# Patient Record
Sex: FEMALE | Race: ASIAN | Employment: OTHER | URBAN - METROPOLITAN AREA
[De-identification: names, ages, dates, MRNs, and addresses within clinical notes are randomized per-mention and may not be internally consistent; named-entity substitution may affect disease eponyms.]

---

## 2017-02-06 ENCOUNTER — OFFICE VISIT (OUTPATIENT)
Dept: FAMILY MEDICINE | Facility: CLINIC | Age: 67
End: 2017-02-06
Payer: COMMERCIAL

## 2017-02-06 ENCOUNTER — HOSPITAL ENCOUNTER (INPATIENT)
Facility: CLINIC | Age: 67
LOS: 2 days | Discharge: HOME | DRG: 193 | End: 2017-02-08
Attending: PHYSICIAN ASSISTANT | Admitting: FAMILY MEDICINE
Payer: COMMERCIAL

## 2017-02-06 ENCOUNTER — APPOINTMENT (OUTPATIENT)
Dept: GENERAL RADIOLOGY | Facility: CLINIC | Age: 67
DRG: 193 | End: 2017-02-06
Attending: EMERGENCY MEDICINE
Payer: COMMERCIAL

## 2017-02-06 ENCOUNTER — APPOINTMENT (OUTPATIENT)
Dept: CT IMAGING | Facility: CLINIC | Age: 67
DRG: 193 | End: 2017-02-06
Attending: EMERGENCY MEDICINE
Payer: COMMERCIAL

## 2017-02-06 VITALS
SYSTOLIC BLOOD PRESSURE: 122 MMHG | WEIGHT: 121.2 LBS | DIASTOLIC BLOOD PRESSURE: 66 MMHG | RESPIRATION RATE: 24 BRPM | HEART RATE: 93 BPM | BODY MASS INDEX: 24.44 KG/M2 | HEIGHT: 59 IN | TEMPERATURE: 101 F | OXYGEN SATURATION: 86 %

## 2017-02-06 DIAGNOSIS — J45.31 MILD PERSISTENT ASTHMA WITH EXACERBATION: Primary | ICD-10-CM

## 2017-02-06 DIAGNOSIS — R05.9 COUGH: ICD-10-CM

## 2017-02-06 DIAGNOSIS — R10.84 ABDOMINAL PAIN, GENERALIZED: ICD-10-CM

## 2017-02-06 DIAGNOSIS — J18.9 COMMUNITY ACQUIRED PNEUMONIA: ICD-10-CM

## 2017-02-06 DIAGNOSIS — R50.9 FEVER, UNSPECIFIED: Primary | ICD-10-CM

## 2017-02-06 DIAGNOSIS — J45.901 ASTHMA EXACERBATION: ICD-10-CM

## 2017-02-06 DIAGNOSIS — J96.01 ACUTE RESPIRATORY FAILURE WITH HYPOXIA (H): ICD-10-CM

## 2017-02-06 DIAGNOSIS — J45.901 ASTHMA WITH ACUTE EXACERBATION, UNSPECIFIED ASTHMA SEVERITY: ICD-10-CM

## 2017-02-06 LAB
ALBUMIN SERPL-MCNC: 3 G/DL (ref 3.4–5)
ALP SERPL-CCNC: 60 U/L (ref 40–150)
ALT SERPL W P-5'-P-CCNC: 35 U/L (ref 0–50)
ANION GAP SERPL CALCULATED.3IONS-SCNC: 6 MMOL/L (ref 3–14)
AST SERPL W P-5'-P-CCNC: 54 U/L (ref 0–45)
BASOPHILS # BLD AUTO: 0.1 10E9/L (ref 0–0.2)
BASOPHILS NFR BLD AUTO: 0.9 %
BILIRUB SERPL-MCNC: 0.7 MG/DL (ref 0.2–1.3)
BUN SERPL-MCNC: 14 MG/DL (ref 7–30)
CALCIUM SERPL-MCNC: 8.1 MG/DL (ref 8.5–10.1)
CHLORIDE SERPL-SCNC: 103 MMOL/L (ref 94–109)
CO2 SERPL-SCNC: 29 MMOL/L (ref 20–32)
CREAT SERPL-MCNC: 0.86 MG/DL (ref 0.52–1.04)
D DIMER PPP FEU-MCNC: 1.1 UG/ML FEU (ref 0–0.5)
DIFFERENTIAL METHOD BLD: ABNORMAL
EOSINOPHIL # BLD AUTO: 0 10E9/L (ref 0–0.7)
EOSINOPHIL NFR BLD AUTO: 0.2 %
ERYTHROCYTE [DISTWIDTH] IN BLOOD BY AUTOMATED COUNT: 12.7 % (ref 10–15)
FLUAV+FLUBV AG SPEC QL: NEGATIVE
FLUAV+FLUBV AG SPEC QL: NEGATIVE
GFR SERPL CREATININE-BSD FRML MDRD: 66 ML/MIN/1.7M2
GLUCOSE SERPL-MCNC: 106 MG/DL (ref 70–99)
HCT VFR BLD AUTO: 40.7 % (ref 35–47)
HGB BLD-MCNC: 14.2 G/DL (ref 11.7–15.7)
IMM GRANULOCYTES # BLD: 0 10E9/L (ref 0–0.4)
IMM GRANULOCYTES NFR BLD: 0.1 %
LYMPHOCYTES # BLD AUTO: 0.9 10E9/L (ref 0.8–5.3)
LYMPHOCYTES NFR BLD AUTO: 10.2 %
MCH RBC QN AUTO: 34 PG (ref 26.5–33)
MCHC RBC AUTO-ENTMCNC: 34.9 G/DL (ref 31.5–36.5)
MCV RBC AUTO: 97 FL (ref 78–100)
MONOCYTES # BLD AUTO: 1 10E9/L (ref 0–1.3)
MONOCYTES NFR BLD AUTO: 10.6 %
NEUTROPHILS # BLD AUTO: 7.1 10E9/L (ref 1.6–8.3)
NEUTROPHILS NFR BLD AUTO: 78 %
NT-PROBNP SERPL-MCNC: 651 PG/ML (ref 0–900)
PLATELET # BLD AUTO: 153 10E9/L (ref 150–450)
PLATELET # BLD EST: NORMAL 10*3/UL
POTASSIUM SERPL-SCNC: 3.8 MMOL/L (ref 3.4–5.3)
PROT SERPL-MCNC: 7.3 G/DL (ref 6.8–8.8)
RBC # BLD AUTO: 4.18 10E12/L (ref 3.8–5.2)
RBC MORPH BLD: NORMAL
SODIUM SERPL-SCNC: 138 MMOL/L (ref 133–144)
SPECIMEN SOURCE: NORMAL
WBC # BLD AUTO: 9.1 10E9/L (ref 4–11)

## 2017-02-06 PROCEDURE — 71020 XR CHEST 2 VW: CPT

## 2017-02-06 PROCEDURE — 12000007 ZZH R&B INTERMEDIATE

## 2017-02-06 PROCEDURE — 25000132 ZZH RX MED GY IP 250 OP 250 PS 637: Performed by: EMERGENCY MEDICINE

## 2017-02-06 PROCEDURE — 99223 1ST HOSP IP/OBS HIGH 75: CPT | Mod: AI | Performed by: FAMILY MEDICINE

## 2017-02-06 PROCEDURE — 87804 INFLUENZA ASSAY W/OPTIC: CPT | Performed by: NURSE PRACTITIONER

## 2017-02-06 PROCEDURE — 83880 ASSAY OF NATRIURETIC PEPTIDE: CPT | Performed by: EMERGENCY MEDICINE

## 2017-02-06 PROCEDURE — 40000275 ZZH STATISTIC RCP TIME EA 10 MIN

## 2017-02-06 PROCEDURE — 40000274 ZZH STATISTIC RCP CONSULT EA 30 MIN

## 2017-02-06 PROCEDURE — 94640 AIRWAY INHALATION TREATMENT: CPT

## 2017-02-06 PROCEDURE — 80053 COMPREHEN METABOLIC PANEL: CPT | Performed by: EMERGENCY MEDICINE

## 2017-02-06 PROCEDURE — 99215 OFFICE O/P EST HI 40 MIN: CPT | Mod: 25 | Performed by: NURSE PRACTITIONER

## 2017-02-06 PROCEDURE — 94640 AIRWAY INHALATION TREATMENT: CPT | Mod: 76 | Performed by: FAMILY MEDICINE

## 2017-02-06 PROCEDURE — 25000125 ZZHC RX 250: Performed by: EMERGENCY MEDICINE

## 2017-02-06 PROCEDURE — 25500064 ZZH RX 255 OP 636: Performed by: EMERGENCY MEDICINE

## 2017-02-06 PROCEDURE — 96365 THER/PROPH/DIAG IV INF INIT: CPT | Mod: 59

## 2017-02-06 PROCEDURE — 94640 AIRWAY INHALATION TREATMENT: CPT | Mod: 76

## 2017-02-06 PROCEDURE — 99285 EMERGENCY DEPT VISIT HI MDM: CPT | Mod: 25

## 2017-02-06 PROCEDURE — 25000128 H RX IP 250 OP 636: Performed by: EMERGENCY MEDICINE

## 2017-02-06 PROCEDURE — 85379 FIBRIN DEGRADATION QUANT: CPT | Performed by: EMERGENCY MEDICINE

## 2017-02-06 PROCEDURE — 25000125 ZZHC RX 250: Performed by: FAMILY MEDICINE

## 2017-02-06 PROCEDURE — 94640 AIRWAY INHALATION TREATMENT: CPT | Performed by: NURSE PRACTITIONER

## 2017-02-06 PROCEDURE — 71260 CT THORAX DX C+: CPT

## 2017-02-06 PROCEDURE — 85025 COMPLETE CBC W/AUTO DIFF WBC: CPT | Performed by: EMERGENCY MEDICINE

## 2017-02-06 PROCEDURE — 99285 EMERGENCY DEPT VISIT HI MDM: CPT | Performed by: EMERGENCY MEDICINE

## 2017-02-06 RX ORDER — PREDNISONE 20 MG/1
TABLET ORAL
Qty: 20 TABLET | Refills: 0 | Status: ON HOLD | OUTPATIENT
Start: 2017-02-06 | End: 2017-02-08

## 2017-02-06 RX ORDER — IPRATROPIUM BROMIDE AND ALBUTEROL SULFATE 2.5; .5 MG/3ML; MG/3ML
3 SOLUTION RESPIRATORY (INHALATION) ONCE
Status: COMPLETED | OUTPATIENT
Start: 2017-02-06 | End: 2017-02-06

## 2017-02-06 RX ORDER — PREDNISONE 20 MG/1
60 TABLET ORAL ONCE
Status: COMPLETED | OUTPATIENT
Start: 2017-02-06 | End: 2017-02-06

## 2017-02-06 RX ORDER — ALBUTEROL SULFATE 0.83 MG/ML
1 SOLUTION RESPIRATORY (INHALATION) EVERY 6 HOURS PRN
Qty: 1 BOX | Refills: 0 | Status: SHIPPED | OUTPATIENT
Start: 2017-02-06 | End: 2017-02-06

## 2017-02-06 RX ORDER — CEFTRIAXONE 2 G/1
2 INJECTION, POWDER, FOR SOLUTION INTRAMUSCULAR; INTRAVENOUS EVERY 24 HOURS
Status: DISCONTINUED | OUTPATIENT
Start: 2017-02-07 | End: 2017-02-08 | Stop reason: HOSPADM

## 2017-02-06 RX ORDER — IPRATROPIUM BROMIDE AND ALBUTEROL SULFATE 2.5; .5 MG/3ML; MG/3ML
3 SOLUTION RESPIRATORY (INHALATION) 4 TIMES DAILY
Status: DISCONTINUED | OUTPATIENT
Start: 2017-02-06 | End: 2017-02-08 | Stop reason: HOSPADM

## 2017-02-06 RX ORDER — AZITHROMYCIN 250 MG/1
250 TABLET, FILM COATED ORAL EVERY 24 HOURS
Status: DISCONTINUED | OUTPATIENT
Start: 2017-02-07 | End: 2017-02-08 | Stop reason: HOSPADM

## 2017-02-06 RX ORDER — IOPAMIDOL 755 MG/ML
70 INJECTION, SOLUTION INTRAVASCULAR ONCE
Status: COMPLETED | OUTPATIENT
Start: 2017-02-06 | End: 2017-02-06

## 2017-02-06 RX ORDER — ALBUTEROL SULFATE 0.83 MG/ML
3 SOLUTION RESPIRATORY (INHALATION)
Status: DISCONTINUED | OUTPATIENT
Start: 2017-02-06 | End: 2017-02-08 | Stop reason: HOSPADM

## 2017-02-06 RX ORDER — AZITHROMYCIN 250 MG/1
500 TABLET, FILM COATED ORAL ONCE
Status: COMPLETED | OUTPATIENT
Start: 2017-02-06 | End: 2017-02-06

## 2017-02-06 RX ORDER — AZITHROMYCIN 250 MG/1
250 TABLET, FILM COATED ORAL DAILY
Qty: 6 TABLET | Refills: 0 | Status: SHIPPED | OUTPATIENT
Start: 2017-02-06 | End: 2017-02-08

## 2017-02-06 RX ORDER — CEFTRIAXONE 2 G/1
2 INJECTION, POWDER, FOR SOLUTION INTRAMUSCULAR; INTRAVENOUS ONCE
Status: COMPLETED | OUTPATIENT
Start: 2017-02-06 | End: 2017-02-06

## 2017-02-06 RX ORDER — PREDNISONE 20 MG/1
60 TABLET ORAL DAILY
Status: DISCONTINUED | OUTPATIENT
Start: 2017-02-07 | End: 2017-02-08 | Stop reason: HOSPADM

## 2017-02-06 RX ORDER — AZITHROMYCIN 250 MG/1
250 TABLET, FILM COATED ORAL DAILY
Qty: 4 TABLET | Refills: 0 | Status: SHIPPED | OUTPATIENT
Start: 2017-02-06 | End: 2017-02-06

## 2017-02-06 RX ORDER — CODEINE PHOSPHATE AND GUAIFENESIN 10; 100 MG/5ML; MG/5ML
1 SOLUTION ORAL EVERY 4 HOURS PRN
Qty: 120 ML | Refills: 0 | Status: SHIPPED | OUTPATIENT
Start: 2017-02-06 | End: 2017-02-06 | Stop reason: SINTOL

## 2017-02-06 RX ORDER — ALBUTEROL SULFATE 0.83 MG/ML
1 SOLUTION RESPIRATORY (INHALATION) EVERY 4 HOURS PRN
Qty: 1 BOX | Refills: 1 | Status: SHIPPED | OUTPATIENT
Start: 2017-02-06 | End: 2017-02-15

## 2017-02-06 RX ADMIN — SODIUM CHLORIDE 500 ML: 9 INJECTION, SOLUTION INTRAVENOUS at 14:56

## 2017-02-06 RX ADMIN — SODIUM CHLORIDE 90 ML: 9 INJECTION, SOLUTION INTRAVENOUS at 14:59

## 2017-02-06 RX ADMIN — IPRATROPIUM BROMIDE AND ALBUTEROL SULFATE 3 ML: .5; 3 SOLUTION RESPIRATORY (INHALATION) at 21:12

## 2017-02-06 RX ADMIN — IPRATROPIUM BROMIDE AND ALBUTEROL SULFATE 3 ML: .5; 3 SOLUTION RESPIRATORY (INHALATION) at 13:27

## 2017-02-06 RX ADMIN — PREDNISONE 60 MG: 20 TABLET ORAL at 13:25

## 2017-02-06 RX ADMIN — CEFTRIAXONE 2 G: 2 INJECTION, POWDER, FOR SOLUTION INTRAMUSCULAR; INTRAVENOUS at 14:55

## 2017-02-06 RX ADMIN — IOPAMIDOL 70 ML: 755 INJECTION, SOLUTION INTRAVENOUS at 15:00

## 2017-02-06 RX ADMIN — IPRATROPIUM BROMIDE AND ALBUTEROL SULFATE 3 ML: .5; 3 SOLUTION RESPIRATORY (INHALATION) at 14:57

## 2017-02-06 RX ADMIN — AZITHROMYCIN 500 MG: 250 TABLET, FILM COATED ORAL at 13:26

## 2017-02-06 ASSESSMENT — ENCOUNTER SYMPTOMS: ABDOMINAL PAIN: 1

## 2017-02-06 ASSESSMENT — ACTIVITIES OF DAILY LIVING (ADL)
AMBULATION: 0-->INDEPENDENT
FALL_HISTORY_WITHIN_LAST_SIX_MONTHS: NO
RETIRED_EATING: 0-->INDEPENDENT
BATHING: 0-->INDEPENDENT
DRESS: 0-->INDEPENDENT
TOILETING: 0-->INDEPENDENT
RETIRED_COMMUNICATION: 0-->UNDERSTANDS/COMMUNICATES WITHOUT DIFFICULTY
TRANSFERRING: 0-->INDEPENDENT
CHANGE_IN_FUNCTIONAL_STATUS_SINCE_ONSET_OF_CURRENT_ILLNESS/INJURY: NO
SWALLOWING: 0-->SWALLOWS FOODS/LIQUIDS WITHOUT DIFFICULTY
COGNITION: 0 - NO COGNITION ISSUES REPORTED

## 2017-02-06 NOTE — ED AVS SNAPSHOT
[facility] Emergency Department    [address] [facility] [address]    Phone:  [phone]    Fax:  [phone]                                       Karen Guevara   MRN: [id number]    Department:  [facility] Emergency Department   Date of Visit:  2/6/2017           Patient Information     Date Of Birth          1950        Your diagnoses for this visit were:     Asthma exacerbation     Community acquired pneumonia     Asthma with acute exacerbation, unspecified asthma severity        You were seen by Fatou Rodas PA-C and Geronimo Jackson MD.      Follow-up Information     Schedule an appointment as soon as possible for a visit with Lavern Jefferson DO.    Specialty:  Internal Medicine    Why:  For re-evaluation if symptoms not resolving over the next 24-48 hours    Contact information:    [address] [address]  [phone]          Follow up with [facility] Emergency Department.    Specialty:  EMERGENCY MEDICINE    Why:  If symptoms worsen    Contact information:    [address] [place] [address] [phone]    Additional information:    The medical center is located at   [address] [place] [address] (between [facility] and   [place] [address] in [place], [place] miles north   of [place]).        Discharge Instructions         Pneumonia (Adult)  Pneumonia is an infection deep within the lungs. It is in the small air sacs (alveoli). Pneumonia may be caused by a virus or bacteria. Pneumonia caused by bacteria is usually treated with an antibiotic. Severe cases may need to be treated in the hospital. Milder cases can be treated at home. Symptoms usually start to get better during the first 2 days of treatment.    Home care  Follow these guidelines when caring for yourself at home:    Rest at home for the first 2 to 3 days, or until you feel stronger. Don t let yourself get overly tired when you go back to your activities.    Stay away  from cigarette smoke - yours or other people s.    You may use acetaminophen or ibuprofen to control fever or pain, unless another medicine was prescribed. If you have chronic liver or kidney disease, talk with your health care provider before using these medicines. Also talk with your provider if you ve had a stomach ulcer or GI bleeding. Don t give aspirin to anyone younger than 18 years of age who is ill with a fever. It may cause severe liver damage.    Your appetite may be poor, so a light diet is fine.    Drink 6 to 8 glasses of fluids every day to make sure you are getting enough fluids. Beverages can include water, sport drinks, sodas without caffeine, juices, tea, or soup. Fluids will help loosen secretions in the lung. This will make it easier for you to cough up the phlegm (sputum). If you also have heart or kidney disease, check with your health care provider before you drink extra fluids.    Take antibiotic medicine prescribed until it is all gone, even if you are feeling better after a few days.  Follow-up care  Follow up with your health care provider in the next 2 to 3 days, or as advised. This is to be sure the medicine is helping you get better.  If you are 65 or older, you should get a pneumococcal vaccine and a yearly flu (influenza) shot. You should also get these vaccines if you have chronic lung disease like asthma, emphysema, or COPD. Ask your provider about this.  When to seek medical advice  Call your health care provider right away if any of these occur:    You don t get better within the first 48 hours of treatment    Shortness of breath gets worse    Rapid breathing (more than 25 breaths per minute)    Coughing up blood    Chest pain gets worse with breathing    Fever of 102 F (38 C) or higher that doesn t get better with fever medicine    Weakness, dizziness, or fainting that gets worse    Thirst or dry mouth that gets worse    Sinus pain, headache, or a stiff neck    Chest pain not  caused by coughing    5059-8679 [employer]. [address] [place] [address], [place] [address]. All rights reserved. This information is not intended as a substitute for professional medical care. Always follow your healthcare professional's instructions.          Discharge References/Attachments     PNEUMONIA, WHAT IS (ENGLISH)    PNEUMONIA, DISCHARGE INSTRUCTIONS FOR (ENGLISH)    ASTHMA, ACUTE (ADULT) (ENGLISH)    ASTHMA, DISCHARGE INSTRUCTIONS FOR (ENGLISH)    ASTHMA, UNDERSTANDING (ENGLISH)      24 Hour Appointment Hotline       To make an appointment at any [facility], call [phone] ([phone]). If you don't have a family doctor or clinic, we will help you find one. [place] clinics are conveniently located to serve the needs of you and your family.             Review of your medicines      START taking        Dose / Directions Last dose taken    amoxicillin-clavulanate 875-125 MG per tablet   Commonly known as:  AUGMENTIN   Dose:  1 tablet   Quantity:  20 tablet        Take 1 tablet by mouth 2 times daily for 10 days   Refills:  0        azithromycin 250 MG tablet   Commonly known as:  ZITHROMAX   Dose:  250 mg   Quantity:  6 tablet        Take 1 tablet (250 mg) by mouth daily for 6 days One tablet daily for 6 days beginning on Tuesday 2/7/17 (1st dose given in the ER).   Refills:  0          CONTINUE these medicines which may have CHANGED, or have new prescriptions. If we are uncertain of the size of tablets/capsules you have at home, strength may be listed as something that might have changed.        Dose / Directions Last dose taken    * albuterol 108 (90 BASE) MCG/ACT Inhaler   Commonly known as:  VENTOLIN HFA   Dose:  2 puff   What changed:  Another medication with the same name was changed. Make sure you understand how and when to take each.   Quantity:  1 Inhaler        Inhale 2 puffs into the lungs every 4 hours as needed for shortness of breath / dyspnea or wheezing   Refills:   11        * albuterol (2.5 MG/3ML) 0.083% neb solution   Dose:  1 vial   What changed:  when to take this   Quantity:  1 Box        Take 1 vial (2.5 mg) by nebulization every 4 hours as needed for shortness of breath / dyspnea or wheezing   Refills:  1        * Notice:  This list has 2 medication(s) that are the same as other medications prescribed for you. Read the directions carefully, and ask your doctor or other care provider to review them with you.      Our records show that you are taking the medicines listed below. If these are incorrect, please call your family doctor or clinic.        Dose / Directions Last dose taken    cholecalciferol 1000 UNIT tablet   Commonly known as:  vitamin D        1 TABLET DAILY   Refills:  0        CHOLEST OFF PO        take one tablet by mouth when eating a meal that is fatty   Refills:  0        coenzyme Q-10 capsule   Dose:  1 capsule        Take 1 capsule by mouth daily as needed   Refills:  0        DAILY HERBS IMMUNE DEFENSE PO   Dose:  2 capsule        Take 2 capsules by mouth every evening Immune System Defense with IP-6.  Supports healthy cell development   Refills:  0        fexofenadine 180 MG tablet   Commonly known as:  ALLEGRA   Dose:  180 mg   Quantity:  30 tablet        Take 1 tablet (180 mg) by mouth daily   Refills:  1        fluticasone 50 MCG/ACT spray   Commonly known as:  FLONASE   Dose:  1-2 spray   Quantity:  1 Bottle        Spray 1-2 sprays into both nostrils daily   Refills:  11        OIL OF OREGANO PO        Refills:  0        predniSONE 20 MG tablet   Commonly known as:  DELTASONE   Quantity:  20 tablet        Take 3 tabs (60 mg) by mouth daily x 3 days, 2 tabs (40 mg) daily x 3 days, 1 tab (20 mg) daily x 3 days, then 1/2 tab (10 mg) x 3 days.   Refills:  0        VITAMIN A PO   Dose:  1 capsule        Take 1 capsule by mouth daily   Refills:  0        vitamin B complex with vitamin C Tabs tablet   Dose:  1 tablet        Take 1 tablet by mouth  daily   Refills:  0        Zinc 25 MG Tabs   Dose:  0.5 tablet        Take 0.5 tablets by mouth daily Taking 1/2 tablet daily.   Refills:  0                Prescriptions were sent or printed at these locations (3 Prescriptions)                   [facility] [place], [place] - [address] [facility]   [address] [facility] [address]    Telephone:  [phone]   Fax:  [phone]   Hours:                  E-Prescribed (3 of 3)         albuterol (2.5 MG/3ML) 0.083% neb solution               amoxicillin-clavulanate (AUGMENTIN) 875-125 MG per tablet               azithromycin (ZITHROMAX) 250 MG tablet                Procedures and tests performed during your visit     CBC with platelets, differential    Chest CT - IV contrast only - PE protocol    Comprehensive metabolic panel    D dimer quantitative    NT pro BNP    XR Chest 2 Views      Orders Needing Specimen Collection     None      Pending Results     No orders found from 2/5/2017 to 2/7/2017.            Pending Culture Results     No orders found from 2/5/2017 to 2/7/2017.       Test Results from your hospital stay           2/6/2017  3:05 PM - Interface, iyzico Results      Component Results     Component Value Ref Range & Units Status    WBC 9.1 4.0 - 11.0 10e9/L Final    RBC Count 4.18 3.8 - 5.2 10e12/L Final    Hemoglobin 14.2 11.7 - 15.7 g/dL Final    Hematocrit 40.7 35.0 - 47.0 % Final    MCV 97 78 - 100 fl Final    MCH 34.0 (H) 26.5 - 33.0 pg Final    MCHC 34.9 31.5 - 36.5 g/dL Final    RDW 12.7 10.0 - 15.0 % Final    Platelet Count 153 150 - 450 10e9/L Final    Diff Method Automated Method  Final    % Neutrophils 78.0 % Final    % Lymphocytes 10.2 % Final    % Monocytes 10.6 % Final    % Eosinophils 0.2 % Final    % Basophils 0.9 % Final    % Immature Granulocytes 0.1 % Final    Absolute Neutrophil 7.1 1.6 - 8.3 10e9/L Final    Absolute Lymphocytes 0.9 0.8 - 5.3 10e9/L Final    Absolute Monocytes 1.0 0.0 - 1.3 10e9/L Final    Absolute  Eosinophils 0.0 0.0 - 0.7 10e9/L Final    Absolute Basophils 0.1 0.0 - 0.2 10e9/L Final    Abs Immature Granulocytes 0.0 0 - 0.4 10e9/L Final    RBC Morphology Normal  Final    Platelet Estimate Normal  Final         2/6/2017  2:09 PM - Interface, Flexilab Results      Component Results     Component Value Ref Range & Units Status    Sodium 138 133 - 144 mmol/L Final    Potassium 3.8 3.4 - 5.3 mmol/L Final    Chloride 103 94 - 109 mmol/L Final    Carbon Dioxide 29 20 - 32 mmol/L Final    Anion Gap 6 3 - 14 mmol/L Final    Glucose 106 (H) 70 - 99 mg/dL Final    Urea Nitrogen 14 7 - 30 mg/dL Final    Creatinine 0.86 0.52 - 1.04 mg/dL Final    GFR Estimate 66 >60 mL/min/1.7m2 Final    Non  GFR Calc    GFR Estimate If Black 80 >60 mL/min/1.7m2 Final    African American GFR Calc    Calcium 8.1 (L) 8.5 - 10.1 mg/dL Final    Bilirubin Total 0.7 0.2 - 1.3 mg/dL Final    Albumin 3.0 (L) 3.4 - 5.0 g/dL Final    Protein Total 7.3 6.8 - 8.8 g/dL Final    Alkaline Phosphatase 60 40 - 150 U/L Final    ALT 35 0 - 50 U/L Final    AST 54 (H) 0 - 45 U/L Final         2/6/2017  2:09 PM - Interface, Flexilab Results      Component Results     Component Value Ref Range & Units Status    N-Terminal Pro BNP Inpatient 651 0 - 900 pg/mL Final    Reference range shown and results flagged as abnormal are suggested inpatient   cut points for confirming diagnosis if CHF in an acute setting. Establishing   a   baseline value for each individual patient is useful for follow-up. An   inpatient or emergency department NT-proPBNP <300 pg/mL effectively rules out   acute CHF, with 99% negative predictive value.  The outpatient non-acute reference range for ruling out CHF is:   0-125 pg/mL (age 18 to less than 75)   0-450 pg/mL (age 75 yrs and older)           2/6/2017  2:07 PM - Interface, Radiant Ib      Narrative     XR CHEST 2 VW 2/6/2017 2:04 PM    HISTORY: Short of breath, fever.    COMPARISON: 12/17/2008    FINDINGS: Bilateral  basilar airspace opacity. No pleural effusion or  pneumothorax. Normal heart size.        Impression     IMPRESSION: Basilar pneumonia.    MARIAH BROOKS MD         2/6/2017  2:01 PM - Interface, Flexilab Results      Component Results     Component Value Ref Range & Units Status    D Dimer 1.1 (H) 0.0 - 0.50 ug/ml FEU Final    This D-dimer assay is intended for use in conjuntion with a clinical pretest   probability assessment model to exclude pulmonary embolism (PE) and as an aid   in the diagnosis of deep venous thrombosis (DVT) in outpatients suspected of   PE   or DVT. The cut-off value is 0.5 g/mL FEU.           2/6/2017  3:52 PM - Interface, Radiant Ib      Narrative     CT CHEST PULMONARY EMBOLISM W CONTRAST 2/6/2017 3:10 PM    HISTORY: Short of breath. Elevated d-dimer.    CONTRAST:  70mL Isovue-370.    TECHNIQUE: CT of the chest is performed with IV contrast per pulmonary  embolus protocol.    This study is particularly tailored to assess the pulmonary arteries  and their branch vessels.  Other assessed structures include the  lungs, mediastinum, pleura, and chest wall.    Radiation dose for this scan is reduced using automated exposure  control, adjustment of the mA and/or kV according to patient size, or  iterative reconstruction technique.    COMPARISON: None.    FINDINGS: Assessment of the right lung shows mixed interstitial and  nodular infiltrates, especially of the right lower lobe where more  confluent abnormality is present. Mild traction bronchiectasis in the  right middle lobe and right lower lobe are suggested. Assessment the  left lung shows interstitial and nodular infiltrates, mainly in the  left lower lobe. More confluent opacity is seen posteromedially in the  left lower lobe. Mild traction bronchiectasis is present in the  lingula and left lower lobe. Bilateral hilar and subcarinal adenopathy  are present. A dominant right hilar lymph node on image #52 measures  1.2 cm. A subcarinal lymph  "node on image #57 measures 1.1 cm. A left  hilar lymph node on image #60 measures 1 cm. The thoracic aorta is  normal in caliber with no dissection. There is a 3.5 cm cyst or nodule  in the right lobe of the thyroid gland. The upper visualized portions  of the abdomen show 0.8 cm right lobe liver lesion on image #114. A  cyst is favored.        Impression     IMPRESSION:  1.  Bilateral lung parenchymal abnormalities are present, especially  in the lower lung zones. Inflammatory/infectious etiologies are  favored.  2. Mild mediastinal and hilar adenopathy are present. They are  nonspecific. They could be reactive, given the lung findings.  3. No CT evidence of pulmonary embolus.    DAVID GONGORA MD                Thank you for choosing [facility]       Thank you for choosing [facility] for your care. Our goal is always to provide you with excellent care. Hearing back from our patients is one way we can continue to improve our services. Please take a few minutes to complete the written survey that you may receive in the mail after you visit with us. Thank you!        [employer] Information     [employer] lets you send messages to your doctor, view your test results, renew your prescriptions, schedule appointments and more. To sign up, go to [email or web address] . Click on \"Log in\" on the left side of the screen, which will take you to the Welcome page. Then click on \"Sign up Now\" on the right side of the page.     You will be asked to enter the access code listed below, as well as some personal information. Please follow the directions to create your username and password.     Your access code is: [email or web address]  Expires: 2017 12:06 PM     Your access code will  in 90 days. If you need help or a new code, please call your [facility] clinic or [phone].        Care EveryWhere ID     This is your Care EveryWhere ID. This could be used by other organizations to access your [facility] medical records  [id number]      "   After Visit Summary       This is your record. Keep this with you and show to your community pharmacist(s) and doctor(s) at your next visit.

## 2017-02-06 NOTE — DISCHARGE INSTRUCTIONS
Pneumonia (Adult)  Pneumonia is an infection deep within the lungs. It is in the small air sacs (alveoli). Pneumonia may be caused by a virus or bacteria. Pneumonia caused by bacteria is usually treated with an antibiotic. Severe cases may need to be treated in the hospital. Milder cases can be treated at home. Symptoms usually start to get better during the first 2 days of treatment.    Home care  Follow these guidelines when caring for yourself at home:    Rest at home for the first 2 to 3 days, or until you feel stronger. Don t let yourself get overly tired when you go back to your activities.    Stay away from cigarette smoke - yours or other people s.    You may use acetaminophen or ibuprofen to control fever or pain, unless another medicine was prescribed. If you have chronic liver or kidney disease, talk with your health care provider before using these medicines. Also talk with your provider if you ve had a stomach ulcer or GI bleeding. Don t give aspirin to anyone younger than 18 years of age who is ill with a fever. It may cause severe liver damage.    Your appetite may be poor, so a light diet is fine.    Drink 6 to 8 glasses of fluids every day to make sure you are getting enough fluids. Beverages can include water, sport drinks, sodas without caffeine, juices, tea, or soup. Fluids will help loosen secretions in the lung. This will make it easier for you to cough up the phlegm (sputum). If you also have heart or kidney disease, check with your health care provider before you drink extra fluids.    Take antibiotic medicine prescribed until it is all gone, even if you are feeling better after a few days.  Follow-up care  Follow up with your health care provider in the next 2 to 3 days, or as advised. This is to be sure the medicine is helping you get better.  If you are 65 or older, you should get a pneumococcal vaccine and a yearly flu (influenza) shot. You should also get these vaccines if you have  chronic lung disease like asthma, emphysema, or COPD. Ask your provider about this.  When to seek medical advice  Call your health care provider right away if any of these occur:    You don t get better within the first 48 hours of treatment    Shortness of breath gets worse    Rapid breathing (more than 25 breaths per minute)    Coughing up blood    Chest pain gets worse with breathing    Fever of 102 F (38 C) or higher that doesn t get better with fever medicine    Weakness, dizziness, or fainting that gets worse    Thirst or dry mouth that gets worse    Sinus pain, headache, or a stiff neck    Chest pain not caused by coughing    [id number] [employer]. [address] [place] [id number]. All rights reserved. This information is not intended as a substitute for professional medical care. Always follow your healthcare professional's instructions.

## 2017-02-06 NOTE — ED PROVIDER NOTES
History     Chief Complaint   Patient presents with     Shortness of Breath     HPI  Karen Guevara is a 66 year old female with a history of asthma and hyperlipidemia who was sent to the ED from clinic with after presentation for fever, asthma exacerbation, and was found to have hypoxia with oxygen sats at 86% on room air after a neb treatment. Rapid influenza test was negative. Dr. Jefferson also prescribed patient Prednisone taper: 60 mg by mouth daily x 3 days, 40 mg daily x 3 days, 20 mg daily x 3 days, then 10 mg x 3 days. Current sats in the ED are 93% on room air. The patient has asthma and reports a chronic cough that has recently become more frequent with clear sputum.  She has nasal congestion and drainage.  Four days ago she developed subjective fevers, chills, body aches, and runny nose. Her chest also feels more tight and she reports wheezing.  She is using albuterol and nebs at home but infrequently using it because nebs made her feel jittery and cranky and interrupted her sleep. The patient also mentions over the last 3 months she has noticed an intermittent cramping pain in her right upper abdomen, just below the rib cage. She last felt this pain this morning.  Pain is exacerbated by coughing.  Has not previously had this evaluated.  She does not smoke. No hemoptysis or leg pain or swelling. No other acute complaints or concerns.    I have reviewed the Medications, Allergies, Past Medical and Surgical History, and Social History in the Epic system.    Patient Active Problem List   Diagnosis     HYPOPIGMENTATION     Mixed hyperlipidemia     Hypothyroidism     Plantar fascial fibromatosis     Mild persistent asthma with exacerbation     Left sided sciatica     HYPERLIPIDEMIA LDL GOAL <160     Community acquired pneumonia     Acute respiratory failure with hypoxia (H)       Current Outpatient Prescriptions   Medication Sig Dispense Refill     VITAMIN A PO Take 1 capsule by mouth daily        Misc Natural  Products (DAILY HERBS IMMUNE DEFENSE PO) Take 2 capsules by mouth every evening Immune System Defense with IP-6.  Supports healthy cell development       predniSONE (DELTASONE) 20 MG tablet Take 3 tabs (60 mg) by mouth daily x 3 days, 2 tabs (40 mg) daily x 3 days, 1 tab (20 mg) daily x 3 days, then 1/2 tab (10 mg) x 3 days. 20 tablet 0     albuterol (2.5 MG/3ML) 0.083% neb solution Take 1 vial (2.5 mg) by nebulization every 4 hours as needed for shortness of breath / dyspnea or wheezing 1 Box 1     vitamin B complex with vitamin C (VITAMIN  B COMPLEX) TABS tablet Take 1 tablet by mouth daily       amoxicillin-clavulanate (AUGMENTIN) 875-125 MG per tablet Take 1 tablet by mouth 2 times daily for 10 days 20 tablet 0     azithromycin (ZITHROMAX) 250 MG tablet Take 1 tablet (250 mg) by mouth daily for 6 days One tablet daily for 6 days beginning on Tuesday 2/7/17 (1st dose given in the ER). 6 tablet 0     fexofenadine (ALLEGRA) 180 MG tablet Take 1 tablet (180 mg) by mouth daily 30 tablet 1     fluticasone (FLONASE) 50 MCG/ACT nasal spray Spray 1 spray into both nostrils 2 times daily  1 Bottle 11     albuterol (VENTOLIN HFA) 108 (90 BASE) MCG/ACT inhaler Inhale 2 puffs into the lungs every 4 hours as needed for shortness of breath / dyspnea or wheezing 1 Inhaler 11     OIL OF OREGANO PO        Zinc 25 MG TABS Take 0.5 tablets by mouth daily Taking 1/2 tablet daily.       Coenzyme Q-10 capsule Take 1 capsule by mouth daily as needed        VITAMIN D 1000 UNIT OR TABS 1 TABLET DAILY       [DISCONTINUED] albuterol (2.5 MG/3ML) 0.083% neb solution Take 1 vial (2.5 mg) by nebulization every 6 hours as needed for shortness of breath / dyspnea or wheezing 1 Box 0     CHOLEST OFF OR take one tablet by mouth when eating a meal that is fatty         Allergies   Allergen Reactions     Amoxicillin Nausea and Vomiting     Claritin [Loratadine] Hives       Social History   Substance Use Topics     Smoking status: Never Smoker       "Smokeless tobacco: Never Used     Alcohol Use: No       Review of Systems   Gastrointestinal: Positive for abdominal pain (RUQ).   As mentioned above in the history present illness. All other systems were reviewed and are negative.    Physical Exam   BP: 105/68 mmHg  Pulse: 90  Heart Rate: 90  Temp: 99.6  F (37.6  C)  Resp: 20  Height: 149.9 cm (4' 11\")  Weight: 54.885 kg (121 lb)  SpO2: 95 %    Physical Exam   Constitutional: She is oriented to person, place, and time. She appears well-developed and well-nourished. No distress.   HENT:   Head: Normocephalic and atraumatic.   Mouth/Throat: Oropharynx is clear and moist. No oropharyngeal exudate.   Eyes: Conjunctivae and EOM are normal. No scleral icterus.   Neck: Normal range of motion. Neck supple. No JVD present. No tracheal deviation present. No thyromegaly present.   Cardiovascular: Normal rate, regular rhythm and normal heart sounds.  Exam reveals no gallop and no friction rub.    No murmur heard.  Pulmonary/Chest: Effort normal. No respiratory distress. She has decreased breath sounds (mildly decreased breath sounds, symmetrically). She has wheezes (scattered and expiratory wheezes and rhonchi, mildly prolonged exp phase and decreased air movement, speaks in full sentences normally). She has rhonchi (basilar). She has no rales.   Musculoskeletal: Normal range of motion. She exhibits no edema or tenderness.   Neurological: She is alert and oriented to person, place, and time.   Skin: Skin is warm and dry. No rash noted. She is not diaphoretic. No erythema. No pallor.   Psychiatric: She has a normal mood and affect. Her behavior is normal.   Nursing note and vitals reviewed.      ED Course   Procedures             Results for orders placed or performed during the hospital encounter of 02/06/17 (from the past 24 hour(s))   CBC with platelets, differential   Result Value Ref Range    WBC 9.1 4.0 - 11.0 10e9/L    RBC Count 4.18 3.8 - 5.2 10e12/L    Hemoglobin 14.2 " 11.7 - 15.7 g/dL    Hematocrit 40.7 35.0 - 47.0 %    MCV 97 78 - 100 fl    MCH 34.0 (H) 26.5 - 33.0 pg    MCHC 34.9 31.5 - 36.5 g/dL    RDW 12.7 10.0 - 15.0 %    Platelet Count 153 150 - 450 10e9/L    Diff Method Automated Method     % Neutrophils 78.0 %    % Lymphocytes 10.2 %    % Monocytes 10.6 %    % Eosinophils 0.2 %    % Basophils 0.9 %    % Immature Granulocytes 0.1 %    Absolute Neutrophil 7.1 1.6 - 8.3 10e9/L    Absolute Lymphocytes 0.9 0.8 - 5.3 10e9/L    Absolute Monocytes 1.0 0.0 - 1.3 10e9/L    Absolute Eosinophils 0.0 0.0 - 0.7 10e9/L    Absolute Basophils 0.1 0.0 - 0.2 10e9/L    Abs Immature Granulocytes 0.0 0 - 0.4 10e9/L    RBC Morphology Normal     Platelet Estimate Normal    Comprehensive metabolic panel   Result Value Ref Range    Sodium 138 133 - 144 mmol/L    Potassium 3.8 3.4 - 5.3 mmol/L    Chloride 103 94 - 109 mmol/L    Carbon Dioxide 29 20 - 32 mmol/L    Anion Gap 6 3 - 14 mmol/L    Glucose 106 (H) 70 - 99 mg/dL    Urea Nitrogen 14 7 - 30 mg/dL    Creatinine 0.86 0.52 - 1.04 mg/dL    GFR Estimate 66 >60 mL/min/1.7m2    GFR Estimate If Black 80 >60 mL/min/1.7m2    Calcium 8.1 (L) 8.5 - 10.1 mg/dL    Bilirubin Total 0.7 0.2 - 1.3 mg/dL    Albumin 3.0 (L) 3.4 - 5.0 g/dL    Protein Total 7.3 6.8 - 8.8 g/dL    Alkaline Phosphatase 60 40 - 150 U/L    ALT 35 0 - 50 U/L    AST 54 (H) 0 - 45 U/L   NT pro BNP   Result Value Ref Range    N-Terminal Pro BNP Inpatient 651 0 - 900 pg/mL   D dimer quantitative   Result Value Ref Range    D Dimer 1.1 (H) 0.0 - 0.50 ug/ml FEU   XR Chest 2 Views    Narrative    XR CHEST 2 VW 2/6/2017 2:04 PM    HISTORY: Short of breath, fever.    COMPARISON: 12/17/2008    FINDINGS: Bilateral basilar airspace opacity. No pleural effusion or  pneumothorax. Normal heart size.      Impression    IMPRESSION: Basilar pneumonia.    MARIAH BROOKS MD   Chest CT - IV contrast only - PE protocol    Narrative    CT CHEST PULMONARY EMBOLISM W CONTRAST 2/6/2017 3:10 PM    HISTORY: Short  of breath. Elevated d-dimer.    CONTRAST:  70mL Isovue-370.    TECHNIQUE: CT of the chest is performed with IV contrast per pulmonary  embolus protocol.    This study is particularly tailored to assess the pulmonary arteries  and their branch vessels.  Other assessed structures include the  lungs, mediastinum, pleura, and chest wall.    Radiation dose for this scan is reduced using automated exposure  control, adjustment of the mA and/or kV according to patient size, or  iterative reconstruction technique.    COMPARISON: None.    FINDINGS: Assessment of the right lung shows mixed interstitial and  nodular infiltrates, especially of the right lower lobe where more  confluent abnormality is present. Mild traction bronchiectasis in the  right middle lobe and right lower lobe are suggested. Assessment the  left lung shows interstitial and nodular infiltrates, mainly in the  left lower lobe. More confluent opacity is seen posteromedially in the  left lower lobe. Mild traction bronchiectasis is present in the  lingula and left lower lobe. Bilateral hilar and subcarinal adenopathy  are present. A dominant right hilar lymph node on image #52 measures  1.2 cm. A subcarinal lymph node on image #57 measures 1.1 cm. A left  hilar lymph node on image #60 measures 1 cm. The thoracic aorta is  normal in caliber with no dissection. There is a 3.5 cm cyst or nodule  in the right lobe of the thyroid gland. The upper visualized portions  of the abdomen show 0.8 cm right lobe liver lesion on image #114. A  cyst is favored.      Impression    IMPRESSION:  1.  Bilateral lung parenchymal abnormalities are present, especially  in the lower lung zones. Inflammatory/infectious etiologies are  favored.  2. Mild mediastinal and hilar adenopathy are present. They are  nonspecific. They could be reactive, given the lung findings.  3. No CT evidence of pulmonary embolus.    DAVID GONGORA MD       Medications   predniSONE (DELTASONE) tablet 60 mg  (60 mg Oral Given 2/6/17 1325)   azithromycin (ZITHROMAX) tablet 500 mg (500 mg Oral Given 2/6/17 1326)   ipratropium - albuterol 0.5 mg/2.5 mg/3 mL (DUONEB) neb solution 3 mL (3 mLs Nebulization Given 2/6/17 1327)   cefTRIAXone (ROCEPHIN) 2 g vial to attach to  ml bag for ADULTS or NS 50 ml bag for PEDS (0 g Intravenous Stopped 2/6/17 1605)   ipratropium - albuterol 0.5 mg/2.5 mg/3 mL (DUONEB) neb solution 3 mL (3 mLs Nebulization Given 2/6/17 1457)   0.9% sodium chloride BOLUS (0 mLs Intravenous Stopped 2/6/17 1605)   iopamidol (ISOVUE-370) solution 70 mL (70 mLs Intravenous Given 2/6/17 1500)   sodium chloride 0.9 % for CT scan flush dose 90 mL (90 mLs As instructed Given 2/6/17 1459)     1:18 PM Sats as high as 96-98% on room air.  Baseline sat appears to be closer to 92% on RA.    5:30 PM RN reports that patient desaturated to the upper 80s at rest and to 70's with exertion (up to rest room). Will admit.    Discussed case with Dr. Grecia Cooper, hospitalist on call.    Assessments & Plan (with Medical Decision Making)   Patient with history of asthma with recent URI symptoms and asthma exacerbation refractory to multiple nebs in the ED and in primary care clinic prior to transfer to the ED today who has chest x-ray evidence of bilateral patchy infiltrates/pneumonia and hypoxia in the ED.  She'll be admitted for continued IV antibiotic therapy for community acquired pneumonia,  frequent neb therapy and continuation of steroid burst.    I have reviewed the nursing notes.    I have reviewed the findings, diagnosis, plan and need for follow up with the patient.    New Prescriptions    AMOXICILLIN-CLAVULANATE (AUGMENTIN) 875-125 MG PER TABLET    Take 1 tablet by mouth 2 times daily for 10 days    AZITHROMYCIN (ZITHROMAX) 250 MG TABLET    Take 1 tablet (250 mg) by mouth daily for 6 days One tablet daily for 6 days beginning on Tuesday 2/7/17 (1st dose given in the ER).       Final diagnoses:   Community acquired  pneumonia   Asthma with acute exacerbation, unspecified asthma severity   Acute respiratory failure with hypoxia (H)     This document serves as a record of the services and decisions personally performed and made by Geronimo Jackson MD. It was created on HIS/HER behalf by Agata Leos, a trained medical scribe. The creation of this document is based the provider's statements to the medical scribe.  Agata Leos 12:57 PM 2/6/2017    Provider:   The information in this document, created by the medical scribe for me, accurately reflects the services I personally performed and the decisions made by me. I have reviewed and approved this document for accuracy prior to leaving the patient care area.  Geronimo Jackson MD 12:57 PM 2/6/2017 2/6/2017   [facility] EMERGENCY DEPARTMENT      Geronimo Jackson MD  02/07/17 0729

## 2017-02-06 NOTE — IP AVS SNAPSHOT
[facility]    [address] [address]    Phone:  [phone]    Fax:  [phone]                                       After Visit Summary   2/6/2017    Karen Guevara    MRN: [id number]           After Visit Summary Signature Page     I have received my discharge instructions, and my questions have been answered. I have discussed any challenges I see with this plan with the nurse or doctor.    ..........................................................................................................................................  Patient/Patient Representative Signature      ..........................................................................................................................................  Patient Representative Print Name and Relationship to Patient    ..................................................               ................................................  Date                                            Time    ..........................................................................................................................................  Reviewed by Signature/Title    ...................................................              ..............................................  Date                                                            Time

## 2017-02-06 NOTE — NURSING NOTE
"Chief Complaint   Patient presents with     Asthma       Initial /66 mmHg  Pulse 93  Temp(Src) 101  F (38.3  C) (Tympanic)  Resp 40  Ht 4' 11\" (1.499 m)  Wt 121 lb 3.2 oz (54.976 kg)  BMI 24.47 kg/m2  SpO2 90% Estimated body mass index is 24.47 kg/(m^2) as calculated from the following:    Height as of this encounter: 4' 11\" (1.499 m).    Weight as of this encounter: 121 lb 3.2 oz (54.976 kg).  Medication Reconciliation: complete    "

## 2017-02-06 NOTE — MR AVS SNAPSHOT
After Visit Summary   2/6/2017    Karen Guevara    MRN: [id number]           Patient Information     Date Of Birth          1950        Visit Information        Provider Department      2/6/2017 11:20 AM Lavern Galvan APRN [facility]        Today's Diagnoses     Fever, unspecified    -  1     Asthma exacerbation         Abdominal pain, generalized         Cough           Care Instructions    1. Xray today of chest and abdomen today  2. Take Prednisone as directed  3. Use cough syrup as needed for cough- this can make you sleepy so do not drive when driving  4. Schedule an appointment with Asthma / allergy clinic            Thank you for choosing [facility].  You may be receiving a survey in the mail from [employer] regarding your visit today.  Please take a few minutes to complete and return the survey to let us know how we are doing.      If you have questions or concerns, please contact us via [employer] or you can contact your care team at [phone].    Our Clinic hours are:  Monday 6:40 am  to 7:00 pm  Tuesday -Friday 6:40 am to 5:00 pm    The [place] outpatient lab hours are:  Monday - Friday 6:10 am to 4:45 pm  Saturdays 7:00 am to 11:00 am  Appointments are required, call [phone]    If you have clinical questions after hours or would like to schedule an appointment,  call the clinic at [phone].        Follow-ups after your visit        Additional Services     ALLERGY/ASTHMA ADULT REFERRAL       Your provider has referred you to: [facility]: [facility] [phone] [email or web address]    Please be aware that coverage of these services is subject to the terms and limitations of your health insurance plan.  Call member services at your health plan with any benefit or coverage questions.      Please bring the following with you to your appointment:    (1) Any X-Rays, CTs or MRIs which have been performed.  Contact the  "facility where they were done to arrange for  prior to your scheduled appointment.    (2) List of current medications  (3) This referral request   (4) Any documents/labs given to you for this referral                  Future tests that were ordered for you today     Open Future Orders        Priority Expected Expires Ordered    XR Abdomen 2 Views Routine 2017            Who to contact     If you have questions or need follow up information about today's clinic visit or your schedule please contact [facility] directly at [phone].  Normal or non-critical lab and imaging results will be communicated to you by [employer], letter or phone within 4 business days after the clinic has received the results. If you do not hear from us within 7 days, please contact the clinic through "[employer]t or phone. If you have a critical or abnormal lab result, we will notify you by phone as soon as possible.  Submit refill requests through [employer] or call your pharmacy and they will forward the refill request to us. Please allow 3 business days for your refill to be completed.          Additional Information About Your Visit        [employer] Information     [employer] lets you send messages to your doctor, view your test results, renew your prescriptions, schedule appointments and more. To sign up, go to [email or web address] . Click on \"Log in\" on the left side of the screen, which will take you to the Welcome page. Then click on \"Sign up Now\" on the right side of the page.     You will be asked to enter the access code listed below, as well as some personal information. Please follow the directions to create your username and password.     Your access code is: [email or web address]  Expires: 2017 12:06 PM     Your access code will  in 90 days. If you need help or a new code, please call your [facility] or [phone].        Care EveryWhere ID     This is your Care EveryWhere ID. This " "could be used by other organizations to access your [place] medical records  [id number]        Your Vitals Were     Pulse Temperature Respirations Height BMI (Body Mass Index) Pulse Oximetry    93 101  F (38.3  C) (Tympanic) 40 4' 11\" (1.499 m) 24.47 kg/m2 90%       Blood Pressure from Last 3 Encounters:   02/06/17 122/66   07/25/16 125/82   09/21/15 122/73    Weight from Last 3 Encounters:   02/06/17 121 lb 3.2 oz (54.976 kg)   07/25/16 124 lb (56.246 kg)   09/21/15 132 lb (59.875 kg)              We Performed the Following     ALLERGY/ASTHMA ADULT REFERRAL     Asthma Action Plan (AAP)     Influenza A/B antigen     XR Chest 2 Views          Today's Medication Changes          These changes are accurate as of: 2/6/17 12:06 PM.  If you have any questions, ask your nurse or doctor.               Start taking these medicines.        Dose/Directions    guaiFENesin-codeine 100-10 MG/5ML Soln solution   Commonly known as:  ROBITUSSIN AC   Used for:  Cough   Started by:  Lavern Galvan APRN CNP        Dose:  1 tsp.   Take 5 mLs by mouth every 4 hours as needed for cough   Quantity:  120 mL   Refills:  0       predniSONE 20 MG tablet   Commonly known as:  DELTASONE   Used for:  Asthma exacerbation   Started by:  Lavern Galvan APRN CNP        Take 3 tabs (60 mg) by mouth daily x 3 days, 2 tabs (40 mg) daily x 3 days, 1 tab (20 mg) daily x 3 days, then 1/2 tab (10 mg) x 3 days.   Quantity:  20 tablet   Refills:  0         These medicines have changed or have updated prescriptions.        Dose/Directions    * albuterol 108 (90 BASE) MCG/ACT Inhaler   Commonly known as:  VENTOLIN HFA   This may have changed:  Another medication with the same name was added. Make sure you understand how and when to take each.   Used for:  Mild persistent asthma with exacerbation   Changed by:  Wilfrido Chow MD        Dose:  2 puff   Inhale 2 puffs into the lungs every 4 hours as needed for shortness of breath / dyspnea or " wheezing   Quantity:  1 Inhaler   Refills:  11       * albuterol (2.5 MG/3ML) 0.083% neb solution   This may have changed:  You were already taking a medication with the same name, and this prescription was added. Make sure you understand how and when to take each.   Used for:  Asthma exacerbation   Changed by:  Lavern Galvan APRN CNP        Dose:  1 vial   Take 1 vial (2.5 mg) by nebulization every 6 hours as needed for shortness of breath / dyspnea or wheezing   Quantity:  1 Box   Refills:  0       * Notice:  This list has 2 medication(s) that are the same as other medications prescribed for you. Read the directions carefully, and ask your doctor or other care provider to review them with you.         Where to get your medicines      These medications were sent to [facility] [address] [place] [address]  [address] [place] [id number]     Phone:  [phone]    - predniSONE 20 MG tablet      Some of these will need a paper prescription and others can be bought over the counter.  Ask your nurse if you have questions.     Bring a paper prescription for each of these medications    - albuterol (2.5 MG/3ML) 0.083% neb solution  - guaiFENesin-codeine 100-10 MG/5ML Soln solution             Primary Care Provider Office Phone # Fax #    Lavern Annie [facility] [phone]       [facility] [address] [facility] [address]        Thank you!     Thank you for choosing [facility]  for your care. Our goal is always to provide you with excellent care. Hearing back from our patients is one way we can continue to improve our services. Please take a few minutes to complete the written survey that you may receive in the mail after your visit with us. Thank you!             Your Updated Medication List - Protect others around you: Learn how to safely use, store and throw away your medicines at www.disposemymeds.org.          This list is accurate as of:  2/6/17 12:06 PM.  Always use your most recent med list.                   Brand Name Dispense Instructions for use    * albuterol 108 (90 BASE) MCG/ACT Inhaler    VENTOLIN HFA    1 Inhaler    Inhale 2 puffs into the lungs every 4 hours as needed for shortness of breath / dyspnea or wheezing       * albuterol (2.5 MG/3ML) 0.083% neb solution     1 Box    Take 1 vial (2.5 mg) by nebulization every 6 hours as needed for shortness of breath / dyspnea or wheezing       cetirizine 10 MG tablet    zyrTEC     Take 1 tablet (10 mg) by mouth every evening       cholecalciferol 1000 UNIT tablet    vitamin D     1 TABLET DAILY       CHOLEST OFF PO      None Entered       coenzyme Q-10 capsule      Take 1 capsule by mouth daily.       cyanocolbalamin 500 MCG tablet    vitamin  B-12     1 tab daily       DAILY HERBS IMMUNE DEFENSE PO          fexofenadine 180 MG tablet    ALLEGRA    30 tablet    Take 1 tablet (180 mg) by mouth daily       fluticasone 110 MCG/ACT Inhaler    FLOVENT HFA    1 Inhaler    Inhale 1 puff into the lungs 2 times daily       fluticasone 50 MCG/ACT spray    FLONASE    1 Bottle    Spray 1-2 sprays into both nostrils daily       folic acid 400 MCG tablet    FOLVITE     1 TABLET DAILY       guaiFENesin-codeine 100-10 MG/5ML Soln solution    ROBITUSSIN AC    120 mL    Take 5 mLs by mouth every 4 hours as needed for cough       Magnesium 400 MG Tabs          niacin 500 MG tablet      2 TABLETS 3 TIMES DAILY AFTER MEALS       OIL OF OREGANO PO          predniSONE 20 MG tablet    DELTASONE    20 tablet    Take 3 tabs (60 mg) by mouth daily x 3 days, 2 tabs (40 mg) daily x 3 days, 1 tab (20 mg) daily x 3 days, then 1/2 tab (10 mg) x 3 days.       SENTRY ADULT PO          VITAMIN A PO          Zinc 25 MG Tabs      Taking 1/2 tablet daily.       * Notice:  This list has 2 medication(s) that are the same as other medications prescribed for you. Read the directions carefully, and ask your doctor or other care provider  to review them with you.

## 2017-02-06 NOTE — PROGRESS NOTES
"  SUBJECTIVE:                                                    Karen Guevara is a 66 year old female who presents to clinic today for the following health issues:      Asthma Follow-Up    Was ACT completed today?    Yes    ACT Total Scores 2/6/2017   ACT TOTAL SCORE -   ASTHMA ER VISITS -   ASTHMA HOSPITALIZATIONS -   ACT TOTAL SCORE (Goal Greater than or Equal to 20) 11   In the past 12 months, how many times did you visit the emergency room for your asthma without being admitted to the hospital? 0   In the past 12 months, how many times were you hospitalized overnight because of your asthma? 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 Within the last 3 months pt has been experiencing an intermittent \"spasm\" / cramping from under her rib cage.     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       Problems taking medications regularly: Yes,  cost of medication-Albuterol; montelukast (SINGULAIR) 10 MG tablet-stopped taking 1 week ago-GI upset     Medication side effects: none    Diet: regular (no restrictions)    Pt want to wait on Flu vaccine because of illness today.   Reminded of other immunizations-pt will think about     History of asthma- coughing for past 9 months- perfumes, cold weather and animals are triggers. A few weeks ago symptoms of wheezing, coughing and shortness of breath increased.   Unable to afford Ventalin so not using. Using Albuterol every 4 hrs. Does not like to use nebulizer as it makes her fatigued and feels jittery.   Upper quadrant bilateral pain when coughing/spasms- or when sitting down/ any movement.   No changes with bowel or bladder. No history of colon cancer or IBS. Does not wear bra due to increase in pressure on abd.   Food does not make symptoms worse. No bulges noted. No abdominal surgery's noted.   No " "history of tobacco use  Did not get influenza vaccine     -------------------------------------    Problem list and histories reviewed & adjusted, as indicated.  Additional history: as documented    Problem list, Medication list, Allergies, and Medical/Social/Surgical histories reviewed in [facility] and updated as appropriate.    ROS:  Constitutional, HEENT, cardiovascular, pulmonary, GI, , musculoskeletal, neuro, skin, endocrine and psych systems are negative, except as otherwise noted.    OBJECTIVE:                                                    /66 mmHg  Pulse 93  Temp(Src) 101  F (38.3  C) (Tympanic)  Resp 24  Ht 4' 11\" (1.499 m)  Wt 121 lb 3.2 oz (54.976 kg)  BMI 24.47 kg/m2  SpO2 86%  Body mass index is 24.47 kg/(m^2).  GENERAL: alert, no distress, fatigued- congested cough during exam  NECK: no adenopathy, no asymmetry, masses, or scars and thyroid normal to palpation  RESP: lungs clear to auscultation - no rales, rhonchi or wheezes, expiratory wheezes throughout and inspiratory wheezes throughout  CV: regular rate and rhythm, normal S1 S2, no S3 or S4, no murmur, click or rub, no peripheral edema and peripheral pulses strong  ABDOMEN: soft, nontender, no hepatosplenomegaly, no masses and bowel sounds normal  MS: no gross musculoskeletal defects noted, no edema    Diagnostic Test Results:  none      ASSESSMENT/PLAN:                                                      1. Fever, unspecified  ? Pneumonia vs influenza  - Influenza A/B antigen  - ALLERGY/ASTHMA ADULT REFERRAL    2. Asthma exacerbation  Uncontrolled past 9 months  - stopped Montelukast due to side effects  - ALLERGY/ASTHMA ADULT REFERRAL  - predniSONE (DELTASONE) 20 MG tablet; Take 3 tabs (60 mg) by mouth daily x 3 days, 2 tabs (40 mg) daily x 3 days, 1 tab (20 mg) daily x 3 days, then 1/2 tab (10 mg) x 3 days.  Dispense: 20 tablet; Refill: 0  - albuterol (2.5 MG/3ML) 0.083% neb solution; Take 1 vial (2.5 mg) by nebulization every 6 " hours as needed for shortness of breath / dyspnea or wheezing  Dispense: 1 Box; Refill: 0  Albuterol nebulizer given in clinic- without improvement in oxygen sats (86%) and symptoms of wheezing    3. Abdominal pain, generalized  Physical exam unremarkable- likely musculoskeletal pain from coughing    4. Cough      Reports was called to ER physician Dr. Jackson-   Patient sent to ER for further evaluation and treatment of symptoms due to hypoxia.     > 40 min spent in direct face to face time with this patient, greater than 50% in counseling and coordination of care in workup and management of symptoms of asthma exacerbation with fever      SAMUEL Barajas [facility]

## 2017-02-06 NOTE — NURSING NOTE
The following medication was given:     MEDICATION: Albuterol Neb   ROUTE: PO-inhalation   SITE: mouth  DOSE: 2.5mg/3 mL  LOT #: [id number]  :  [employer]  EXPIRATION DATE:  03/01/2017  NDC#: 9418-4329-46

## 2017-02-06 NOTE — IP AVS SNAPSHOT
MRN:[id number]                      After Visit Summary   2/6/2017    Karen Guevara    MRN: [id number]           Thank you!     Thank you for choosing [facility] for your care. Our goal is always to provide you with excellent care. Hearing back from our patients is one way we can continue to improve our services. Please take a few minutes to complete the written survey that you may receive in the mail after you visit with us. Thank you!        Patient Information     Date Of Birth          1950        About your hospital stay     You were admitted on:  February 6, 2017 You last received care in the:  [facility]    You were discharged on:  February 8, 2017       Who to Call     For medical emergencies, please call 911.  For non-urgent questions about your medical care, please call your primary care provider or clinic, [phone]          Attending Provider     Provider    Fatou Rodas PA-C Genia, Anthony L, MD Anderson, MD Emeli Gunderson, Harvey GRIGGS MD       Primary Care Provider Office Phone # Fax #    Lavern Valencia DO Ronny [phone]       [facility] [address] [place] [address]        Your next 10 appointments already scheduled     Feb 15, 2017 10:00 AM   SHORT with Nik Jeong MD   [facility]    [address] [id number] [phone]              Further instructions from your care team         Pneumonia (Adult)  Pneumonia is an infection deep within the lungs. It is in the small air sacs (alveoli). Pneumonia may be caused by a virus or bacteria. Pneumonia caused by bacteria is usually treated with an antibiotic. Severe cases may need to be treated in the hospital. Milder cases can be treated at home. Symptoms usually start to get better during the first 2 days of treatment.    Home care  Follow these guidelines when caring for yourself at home:    Rest at  home for the first 2 to 3 days, or until you feel stronger. Don t let yourself get overly tired when you go back to your activities.    Stay away from cigarette smoke - yours or other people s.    You may use acetaminophen or ibuprofen to control fever or pain, unless another medicine was prescribed. If you have chronic liver or kidney disease, talk with your health care provider before using these medicines. Also talk with your provider if you ve had a stomach ulcer or GI bleeding. Don t give aspirin to anyone younger than 18 years of age who is ill with a fever. It may cause severe liver damage.    Your appetite may be poor, so a light diet is fine.    Drink 6 to 8 glasses of fluids every day to make sure you are getting enough fluids. Beverages can include water, sport drinks, sodas without caffeine, juices, tea, or soup. Fluids will help loosen secretions in the lung. This will make it easier for you to cough up the phlegm (sputum). If you also have heart or kidney disease, check with your health care provider before you drink extra fluids.    Take antibiotic medicine prescribed until it is all gone, even if you are feeling better after a few days.  Follow-up care  Follow up with your health care provider in the next 2 to 3 days, or as advised. This is to be sure the medicine is helping you get better.  If you are 65 or older, you should get a pneumococcal vaccine and a yearly flu (influenza) shot. You should also get these vaccines if you have chronic lung disease like asthma, emphysema, or COPD. Ask your provider about this.  When to seek medical advice  Call your health care provider right away if any of these occur:    You don t get better within the first 48 hours of treatment    Shortness of breath gets worse    Rapid breathing (more than 25 breaths per minute)    Coughing up blood    Chest pain gets worse with breathing    Fever of 102 F (38 C) or higher that doesn t get better with fever  "medicine    Weakness, dizziness, or fainting that gets worse    Thirst or dry mouth that gets worse    Sinus pain, headache, or a stiff neck    Chest pain not caused by coughing    0736-4738 [employer]. [address] [place] [address], [place] [address]. All rights reserved. This information is not intended as a substitute for professional medical care. Always follow your healthcare professional's instructions.          Pending Results     No orders found from 2017 to 2017.            Statement of Approval     Ordered          17 1220  I have reviewed and agree with all the recommendations and orders detailed in this document.   EFFECTIVE NOW     Approved and electronically signed by:  Harvey Sainz MD             Admission Information        Provider Department Dept Phone    2017 Harvey Sainz MD, MD [facility] [phone]      Your Vitals Were     Blood Pressure Pulse Temperature    115/68 mmHg 77 97.9  F (36.6  C) (Oral)    Respirations Height Weight    18 1.499 m (4' 11\") 57.4 kg (126 lb 8.7 oz)    BMI (Body Mass Index) Pulse Oximetry       25.55 kg/m2 90%       MyChart Information     [employer] lets you send messages to your doctor, view your test results, renew your prescriptions, schedule appointments and more. To sign up, go to [email or web address] . Click on \"Log in\" on the left side of the screen, which will take you to the Welcome page. Then click on \"Sign up Now\" on the right side of the page.     You will be asked to enter the access code listed below, as well as some personal information. Please follow the directions to create your username and password.     Your access code is: [email or web address]  Expires: 2017 12:06 PM     Your access code will  in 90 days. If you need help or a new code, please call your [facility] clinic or [phone].        Care EveryWhere ID     This is your Care EveryWhere ID. This could be used by other organizations to access your " [facility] medical records  [id number]           Review of your medicines      START taking        Dose / Directions    azithromycin 250 MG tablet   Commonly known as:  ZITHROMAX   Used for:  Community acquired pneumonia        Dose:  250 mg   Take 1 tablet (250 mg) by mouth daily One tablet daily for 6 days beginning on Tuesday 2/7/17 (1st dose given in the ER).   Quantity:  5 tablet   Refills:  0         CONTINUE these medicines which may have CHANGED, or have new prescriptions. If we are uncertain of the size of tablets/capsules you have at home, strength may be listed as something that might have changed.        Dose / Directions    * albuterol (2.5 MG/3ML) 0.083% neb solution   This may have changed:  when to take this   Used for:  Asthma exacerbation        Dose:  1 vial   Take 1 vial (2.5 mg) by nebulization every 4 hours as needed for shortness of breath / dyspnea or wheezing   Quantity:  1 Box   Refills:  1       * albuterol 108 (90 BASE) MCG/ACT Inhaler   Commonly known as:  VENTOLIN HFA   This may have changed:  Another medication with the same name was changed. Make sure you understand how and when to take each.   Used for:  Mild persistent asthma with exacerbation        Dose:  2 puff   Inhale 2 puffs into the lungs every 4 hours as needed for shortness of breath / dyspnea or wheezing   Quantity:  1 Inhaler   Refills:  11       predniSONE 10 MG tablet   Commonly known as:  DELTASONE   This may have changed:    - medication strength  - additional instructions   Used for:  Asthma exacerbation        Take 4 tabs by mouth daily x 5 days, then 3 tabs daily x 3 days, then 2 tabs daily x 3 days then 1 tab daily x 3 days then stop.   Quantity:  38 tablet   Refills:  0       * Notice:  This list has 2 medication(s) that are the same as other medications prescribed for you. Read the directions carefully, and ask your doctor or other care provider to review them with you.      CONTINUE these medicines which have  NOT CHANGED        Dose / Directions    cholecalciferol 1000 UNIT tablet   Commonly known as:  vitamin D        1 TABLET DAILY   Refills:  0       CHOLEST OFF PO        take one tablet by mouth when eating a meal that is fatty   Refills:  0       coenzyme Q-10 capsule        Dose:  1 capsule   Take 1 capsule by mouth daily as needed   Refills:  0       DAILY HERBS IMMUNE DEFENSE PO        Dose:  2 capsule   Take 2 capsules by mouth every evening Immune System Defense with IP-6.  Supports healthy cell development   Refills:  0       fexofenadine 180 MG tablet   Commonly known as:  ALLEGRA        Dose:  180 mg   Take 1 tablet (180 mg) by mouth daily   Quantity:  30 tablet   Refills:  1       fluticasone 50 MCG/ACT spray   Commonly known as:  FLONASE        Dose:  1 spray   Spray 1 spray into both nostrils 2 times daily   Quantity:  1 Bottle   Refills:  11       OIL OF OREGANO PO        Refills:  0       VITAMIN A PO        Dose:  1 capsule   Take 1 capsule by mouth daily   Refills:  0       vitamin B complex with vitamin C Tabs tablet        Dose:  1 tablet   Take 1 tablet by mouth daily   Refills:  0       Zinc 25 MG Tabs        Dose:  0.5 tablet   Take 0.5 tablets by mouth daily Taking 1/2 tablet daily.   Refills:  0            Where to get your medicines      These medications were sent to [facility] [place], [place] - [address] [facility]  [address] [facility] [address]     Phone:  [phone]    - albuterol (2.5 MG/3ML) 0.083% neb solution  - albuterol 108 (90 BASE) MCG/ACT Inhaler  - azithromycin 250 MG tablet  - predniSONE 10 MG tablet             Protect others around you: Learn how to safely use, store and throw away your medicines at www.disposemymeds.org.             Medication List: This is a list of all your medications and when to take them. Check marks below indicate your daily home schedule. Keep this list as a reference.      Medications           Morning Afternoon Evening Bedtime As  Needed    * albuterol (2.5 MG/3ML) 0.083% neb solution   Take 1 vial (2.5 mg) by nebulization every 4 hours as needed for shortness of breath / dyspnea or wheezing                                   * albuterol 108 (90 BASE) MCG/ACT Inhaler   Commonly known as:  VENTOLIN HFA   Inhale 2 puffs into the lungs every 4 hours as needed for shortness of breath / dyspnea or wheezing                                   azithromycin 250 MG tablet   Commonly known as:  ZITHROMAX   Take 1 tablet (250 mg) by mouth daily One tablet daily for 6 days beginning on Tuesday 2/7/17 (1st dose given in the ER).   Last time this was given:  250 mg on 2/7/2017  1:12 PM                                   cholecalciferol 1000 UNIT tablet   Commonly known as:  vitamin D   1 TABLET DAILY                                   CHOLEST OFF PO   take one tablet by mouth when eating a meal that is fatty                                coenzyme Q-10 capsule   Take 1 capsule by mouth daily as needed                                   DAILY HERBS IMMUNE DEFENSE PO   Take 2 capsules by mouth every evening Immune System Defense with IP-6.  Supports healthy cell development                                fexofenadine 180 MG tablet   Commonly known as:  ALLEGRA   Take 1 tablet (180 mg) by mouth daily                                   fluticasone 50 MCG/ACT spray   Commonly known as:  FLONASE   Spray 1 spray into both nostrils 2 times daily                                      OIL OF OREGANO PO                                predniSONE 10 MG tablet   Commonly known as:  DELTASONE   Take 4 tabs by mouth daily x 5 days, then 3 tabs daily x 3 days, then 2 tabs daily x 3 days then 1 tab daily x 3 days then stop.   Last time this was given:  60 mg on 2/8/2017  8:25 AM                                   VITAMIN A PO   Take 1 capsule by mouth daily                                   vitamin B complex with vitamin C Tabs tablet   Take 1 tablet by mouth daily                                    Zinc 25 MG Tabs   Take 0.5 tablets by mouth daily Taking 1/2 tablet daily.                                   * Notice:  This list has 2 medication(s) that are the same as other medications prescribed for you. Read the directions carefully, and ask your doctor or other care provider to review them with you.              More Information        What is Pneumonia?  Pneumonia is a serious lung infection. Many cases of pneumonia are caused by bacteria or viruses. Other less common causes include    Fungi    Chemicals    Gases    You may also get pneumonia after another illness, such as a cold, flu, or bronchitis. Those most at risk include the elderly and people with chronic health problems.  Healthy Lungs    Air travels in and out of the lungs through tubes called airways.    The tubes branch into smaller passages called bronchioles. These end in balloonlike sacs called alveoli.    Blood vessels surrounding the alveoli take oxygen into the bloodstream. At the same time, the alveoli remove carbon dioxide (a waste gas) from the blood. The carbon dioxide is then exhaled.  When You Have Pneumonia      Pneumonia causes the bronchioles and the alveoli to fill with excess mucus and become inflamed.    Your body s response may be to cough. This can help clear out the fluid.    The fluid (or mucus) you cough up may appear green or dark yellow.    The excess mucus may make you feel short of breath.    The inflammation and infection may give you a fever.  What are the Symptoms?  Symptoms of pneumonia can come without warning. At first, you may think you have a cold or flu. But symptoms may get worse quickly, turning into pneumonia. Symyptoms can be different for bacterial and viral pneumonia. Common symptoms may include the following:    Severe cough with green or yellow mucus that doesn't improve or gets worse    Fever and chills    Nausea, vomiting or diarrhea    Shortness of breath with normal daily  activities    Increased heart rate    Chest pain or discomfort when breathing in or coughing    Headache    Excessive sweating and clammy skin    5803-4788 [employer]. [address] [place] [address], [place] [address]. All rights reserved. This information is not intended as a substitute for professional medical care. Always follow your healthcare professional's instructions.                Discharge Instructions for Pneumonia  You have been diagnosed with pneumonia, a serious lung infection. Most cases of pneumonia are caused by bacteria. Pneumonia most often occurs in older adults, young children, and people with chronic health problems.  Home care    Take your medication exactly as directed. Don t skip doses. Continue taking your antibiotics as directed until they are all gone -- even if you start to feel better. This will prevent the pneumonia from coming back.    Drink at least 8 glasses of water daily, unless directed otherwise. This helps to loosen and thin secretions so that you can cough them up.    Use a cool-mist humidifier in your bedroom. Be sure to clean the humidifier daily.    Coughing up mucus is normal. Don t use medications to suppress your cough unless your cough is dry, painful, or interferes with your sleep. You may use an expectorant if ordered by your doctor.    Warm compresses or a heating pad on the lowest setting can be used to relieve chest discomfort. Use several times a day for 15 to 20 minutes at a time. (To prevent injuring your skin, be sure the temperature of the compress or heating pad is warm, not hot.)    Get plenty of rest until your fever, shortness of breath, and chest pain go away.    Plan to get a flu shot every year.    Ask your doctor about pneumonia vaccinations.     Follow-up care  Make a follow-up appointment as directed by our staff.     When to seek medical care  Call 911 right away if you have any of the following:    Chest pain    Trouble breathing    Blue  lips or fingernails  Otherwise, call your doctor if you have any of the following:    Fever above 101.5 F  (38.6 C)    Yellow, green, bloody, or smelly sputum    More than normal mucus production    Vomiting     7559-0971 [employer]. [address] [place] [address]. All rights reserved. This information is not intended as a substitute for professional medical care. Always follow your healthcare professional's instructions.                Asthma (Adult)  Asthma is a disease where the medium and  small air passages within the lung go into spasm and restrict the flow of air. Inflammation and swelling of the airways cause further restriction. During an acute asthma attack, these factors cause difficulty breathing, wheezing, cough and chest tightness.    An asthma attack can be triggered by many things. Common triggers include infections such as the common cold, bronchitis, pneumonia. Irritants such as smoke or pollutants in the air, emotional upset, and exercise can also trigger an attack. In many adults with asthma, allergies to dust, mold, pollen and animal dander can cause an asthma attack. Skipping doses of daily asthma medicine can also bring on an asthma attack.  Asthma can be controlled using the proper medicines prescribed by your healthcare provider and avoiding exposure to known triggers including allergens and irritants.  Home care    Take prescribed medicine exactly at the times advised. If you need medicine such as from a hand held inhaler or aerosol breathing machine more than every 4 hours, contact your healthcare provider or seek immediate medical attention. If prescribed an antibiotic or prednisone, take all of the medicine as prescribed, even if you are feeling better after a few days.    Do not smoke. Avoid being exposed to the smoke of others.    Some people with asthma have worsening of their symptoms when they take aspirin and non-steroidal or fever-reducing medicines like  "ibuprofen and naproxen. Talk to your healthcare provider if you think this may apply to you.  Follow-up care  Follow up with your healthcare provider, or as advised. Always bring all of your current medicines to any appointments with your healthcare provider. Also bring a complete list of medications even those not taken for asthma. If you do not already have one, talk to your healthcare provider about developing a personalized \"Asthma Action Plan.\"  A pneumococcal (pneumonia) vaccine and yearly flu shot (every fall) are recommended. Ask your doctor about this.  When to seek medical advice  Call your healthcare provider right away if any of these occur:     Increased wheezing or shortness of breath    Need to use your inhalers more often than usual without relief    Fever of 100.4 F (38 C) or higher, or as directed by your healthcare provider    Coughing up lots of dark-colored or bloody sputum (mucus)    Chest pain with each breath    If you use a peak flow meter as part of an Asthma Action Plan, and you are still in the yellow zone (50% to 80%) 15 minutes after using inhaler medicine.  Call 911  Call 911 if any of the following occur    Trouble walking or talking because of shortness of breath    If you use a peak flow meter as part of an Asthma Action Plan and you are still in the red zone (less than 50%) 15 minutes after using inhaler medicine    Lips or fingernails turning gray or blue    4918-6927 [employer]. [address] [place] [address]. All rights reserved. This information is not intended as a substitute for professional medical care. Always follow your healthcare professional's instructions.                Discharge Instructions for Asthma  You have been diagnosed with asthma. With the help of your health care provider, you can keep your asthma under control and have less emergency department visits and hospitalizations.    Managing asthma    Take your asthma medications exactly " as your provider tells you.    Learn how to monitor your asthma. Some people watch for early changes of worsening symptoms and some use a peak flow meter.    Be sure to always have a quick-relief inhaler with you. If you were given a prescription, make sure you go to the drug store or pharmacy to get it filled as soon as possible.  Controlling asthma triggers  Triggers are those things that make your asthma symptoms worse or cause asthma attacks.    Dust or dust mites are a common asthma trigger. To avoid a dust mites, do the following:    Use dust-proof covers on your mattress and pillows. Wash the sheets and blankets on your bed once a week in very hot water.    Don t sleep or lie on cloth-covered cushions or furniture.    Ask someone else to vacuum and dust your house.    If you do vacuum and dust yourself, wear a dust mask (from the [employer] store).    Use a vacuum with a double-layered bag or HEPA (high-efficiency particulate air) filter.    Pets with fur or feathers are triggers for some people. If you must have pets, take these precautions:    Keep pets out of your bedroom and off your bed. Keep the bedroom door closed.    Cover the air vents in your bedroom with heavy material to filter the air.    Avoid carpets and cloth-covered furniture in your home. If this is not possible, keep pets out of rooms with these items.    Have someone bathe your pets every week. And, brush them often.    If you smoke, do your best to quit.    Enroll in a stop-smoking program to increase your chance of success.    Ask your health care provider about medications or other methods to help you quit.    Ask family members to quit smoking as well.    Don t allow anyone to smoke in your home, in your car, or around you.    Make sure you know what to do if exercise is a trigger for you. Many people use quick-relief inhalers before exercise or physical activity.    Get a flu shot every year and get pneumonia shots as advised by your  health care provider.    Try to keep your windows closed during pollen, mold, and allergy seasons.    On cold or windy days, cover your nose and mouth with a scarf.    Try to stay away from people who are sick with colds or the flu. Wash your hands often. If respiratory infections, like colds or flu, trigger your asthma, use your quick-relief medications as soon as you begin to notice respiratory symptoms. They may include a runny or stuffy nose, sore throat, or a cough.  Follow-up care  Make a follow-up appointment as directed by our staff.  When to seek medical attention  Call 911 right away if you have:    Severe wheezing    Shortness of breath that is not relieved by your quick-relief medication    Trouble walking or talking because of shortness of breath    Blue lips or fingernails    If you monitor symptoms with a peak flow meter, readings less than 50% of your personal best     7243-1690 [employer]. [address] [place] [address]. All rights reserved. This information is not intended as a substitute for professional medical care. Always follow your healthcare professional's instructions.                Understanding Asthma  Asthma causes swelling and narrowing of the airways in your lungs. No one is exactly sure what causes asthma. It is believed to be a combination of inherited and environmental factors.  Healthy lungs  Inside the lungs there are branching airways made of stretchy tissue. Each airway is wrapped with bands of muscle. The airways are more narrow as they go deeper into the lungs. The smallest airways end in clusters of tiny balloon-like air sacs (alveoli). These clusters are surrounded by blood vessels. When you inhale (breathe in), air enters the lungs. It travels down through the airways until it reaches the air sacs. When you exhale (breathe out), air travels up through the airways and out of the lungs. The airways produce mucus that traps particles you breathe in.  Normally, the mucus is then swept out of the lungs, by tiny hairs (cilia) that line the airways, to be swallowed or coughed up.  What the lungs do  The air you inhale contains oxygen. When oxygen reaches the air sacs, it passes into the blood vessels surrounding the sacs. Your blood then delivers oxygen to all of your cells. Carbon dioxide is removed from the body in a similar way, as you exhale.  When you have asthma     1. Tightened muscle  2. Swollen lining  3. Increased mucus   People with asthma have very sensitive airways. This means the airways react to certain things called triggers (such as pollen, dust, or smoke) and become swollen and narrowed. Inflammation makes the airways swollen and narrowed. This is a chronic (long-lasting or recurring) problem. The airways may not always be narrowed enough to notice breathing problems.  Symptoms of chronic inflammation:     Coughing    Chest tightness    Shortness of breath    Wheezing (a whistling noise, especially when breathing out)    Low energy or feeling tired  Over time, chronic mild inflammation can lead to permanent scarring of airways and loss of lung function.  Moderate flare-ups  When sensitive airways are irritated by a trigger, the muscles around the airways tighten. The lining of the airways swells. Thick, sticky mucus increases and clogs the airways. All of this makes you work harder to keep breathing.  Symptoms of moderate flare-ups:    Coughing, especially at night    Getting tired or out of breath easily    Wheezing    Chest tightness    Faster breathing when at rest  Severe flare-ups   Severe flare-ups are life-threatening. They can cause brain damage or death. In a severe flare-up, the muscle tightening, swelling, and mucus production are even worse. Breathing is extremely difficult. Your body can't get enough oxygen and can't remove carbon dioxide. Waste gas is trapped in the alveoli, and gas exchange can t occur. The body is not getting enough  oxygen. Without oxygen, body tissues, especially brain tissue, begin to die. If this goes on for long, it can lead to brain damage or death.  Call 911, or have someone call for you, if you have any of these symptoms and they are not relieved right away by taking your quick-relief medication as prescribed:    Severe difficulty breathing    Being too short of breath to talk or walk    Lips or fingers turning blue    Feeling lightheaded or dizzy, as though you are about to pass out    Peak flow less than 50% of your personal best, if you use peak flow monitoring  Because asthma is a long-term condition, it is important to work with your health care provider to manage it. If you smoke, get help to quit. Know your triggers and figure out how to avoid them. And, it is very important that you take your medications as instructed. That means taking them, even when you feel good.    3437-9509 [employer]. [address] [place] [address], [place] [address]. All rights reserved. This information is not intended as a substitute for professional medical care. Always follow your healthcare professional's instructions.

## 2017-02-06 NOTE — ED NOTES
Bed: IN01  Expected date:   Expected time:   Means of arrival:   Comments:  Karen Guevara, [id number], asthma exac. R/o pneumonia, in clinic for exam, fever to 101, sats 85-86%, can't afford flovent

## 2017-02-06 NOTE — PATIENT INSTRUCTIONS
1. Xray today of chest and abdomen today  2. Take Prednisone as directed  3. Use cough syrup as needed for cough- this can make you sleepy so do not drive when driving  4. Schedule an appointment with Asthma / allergy clinic            Thank you for choosing [facility].  You may be receiving a survey in the mail from Eliana De La Torre regarding your visit today.  Please take a few minutes to complete and return the survey to let us know how we are doing.      If you have questions or concerns, please contact us via [employer] or you can contact your care team at [phone].    Our Clinic hours are:  Monday 6:40 am  to 7:00 pm  Tuesday -Friday 6:40 am to 5:00 pm    The [place] outpatient lab hours are:  Monday - Friday 6:10 am to 4:45 pm  Saturdays 7:00 am to 11:00 am  Appointments are required, call [phone]    If you have clinical questions after hours or would like to schedule an appointment,  call the clinic at [phone].

## 2017-02-06 NOTE — Clinical Note
My Asthma Action Plan  Name: Karen Guevara   YOB: 1950  Date: 2/6/2017   My doctor: Lavern Jefferson   My clinic: [facility]   [address] [place] [id number]  [phone]    My Control Medicine: Flovent 44/100/220        Dose:   My Rescue Medicine: Albuterol        Dose:    My Asthma Severity: mild persistent  Avoid your asthma triggers: Below is a list of asthma triggers to be aware of         GREEN ZONE   Good Control    I feel good    No cough or wheeze    Can work, sleep and play without asthma symptoms       Take your asthma control medicine every day.     1. If exercise triggers your asthma, take your rescue medication    15 minutes before exercise or sports, and    During exercise if you have asthma symptoms  2. Spacer to use with inhaler: If you have a spacer, make sure to use it with your inhaler             YELLOW ZONE Getting Worse  I have ANY of these:    I do not feel good    Cough or wheeze    Chest feels tight    Wake up at night   1. Keep taking your Green Zone medications  2. Start taking your rescue medicine:    every 20 minutes for up to 1 hour. Then every 4 hours for 24-48 hours.  3. If you stay in the Yellow Zone for more than 12-24 hours, contact your doctor.  4. If you do not return to the Green Zone in 12-24 hours or you get worse, start taking your oral steroid medicine if prescribed by your provider.           RED ZONE Medical Alert - Get Help  I have ANY of these:    I feel awful    Medicine is not helping    Breathing getting harder    Trouble walking or talking    Nose opens wide to breathe       1. Take your rescue medicine NOW  2. If your provider has prescribed an oral steroid medicine, start taking it NOW  3. Call your doctor NOW  4. If you are still in the Red Zone after 20 minutes and you have not reached your doctor:    Take your rescue medicine again and    Call 911 or go to the emergency room right away    See your regular doctor  within 2 weeks of an Emergency Room or Urgent Care visit for follow-up treatment.        The above medication may be given at school or day care?: Yes and N/A (Adult Patient)  Child can carry and use inhaler(s) at school with approval of school nurse?: N/A (Adult Patient)    Electronically signed by: Brenda February 6, 2017    Annual Reminders:  Meet with Asthma Educator,  Flu Shot in the Fall, consider Pneumonia Vaccination for patients with asthma (aged 19 and older).    Pharmacy: [place] [id number]                      Asthma Triggers  How To Control Things That Make Your Asthma Worse    Triggers are things that make your asthma worse.  Look at the list below to help you find your triggers and what you can do about them.  You can help prevent asthma flare-ups by staying away from your triggers.      Trigger                                                          What you can do   Cigarette Smoke  Tobacco smoke can make asthma worse. Do not allow smoking in your home, car or around you.  Be sure no one smokes at a child s day care or school.  If you smoke, ask your health care provider for ways to help you quick.  Ask family members to quit too.  Ask your health care provider for a referral to Quit plan to help you quit smoking, or call 6-037-209-PLAN.     Colds, Flu, Bronchitis  These are common triggers of asthma. Wash your hands often.  Don t touch your eyes, nose or mouth.  Get a flu shot every year.     Dust Mites  These are tiny bugs that live in cloth or carpet. They are too small to see. Wash sheets and blankets in hot water every week.   Encase pillows and mattress in dust mite proof covers.  Avoid having carpet if you can. If you have carpet, vacuum weekly.   Use a dust mask and HEPA vacuum.   Pollen and Outdoor Mold  Some people are allergic to trees, grass, or weed pollen, or molds. Try to keep your windows closed.  Limit time out doors when pollen count is  high.   Ask you health care provider about taking medicine during allergy season.     Animal Dander  Some people are allergic to skin flakes, urine or saliva from pets with fur or feathers. Keep pets with fur or feathers out of your home.    If you can t keep the pet outdoors, then keep the pet out of your bedroom.  Keep the bedroom door closed.  Keep pets off cloth furniture and away from stuffed toys.     Mice, Rats, and Cockroaches  Some people are allergic to the waste from these pests.   Cover food and garbage.  Clean up spills and food crumbs.  Store grease in the refrigerator.   Keep food out of the bedroom.   Indoor Mold  This can be a trigger if your home has high moisture Fix leaking faucets, pipes, or other sources of water.   Clean moldy surfaces.  Dehumidify basement if it is damp and smelly.   Smoke, Strong Odors, and Sprays  These can reduce air quality. Stay away from strong odors and sprays, such as perfume, powder, hair spray, paints, smoke incense, paint, cleaning products, candles and new carpet.   Exercise or Sports  Some people with asthma have this trigger. Be active!  Ask you doctor about taking medicine before sports or exercise to prevent symptoms.    Warm up for 5-10 minutes before and after sports or exercise.     Other Triggers of Asthma  Cold air:  Cover your nose and mouth with a scarf.  Sometimes laughing or crying can be a trigger.  Some medicines and food can trigger asthma.

## 2017-02-06 NOTE — ED AVS SNAPSHOT
[facility] Emergency Department    [address] [address]    Phone:  [phone]    Fax:  [phone]                                       Karen Guevara   MRN: [id number]    Department:  [facility] Emergency Department   Date of Visit:  2/6/2017           After Visit Summary Signature Page     I have received my discharge instructions, and my questions have been answered. I have discussed any challenges I see with this plan with the nurse or doctor.    ..........................................................................................................................................  Patient/Patient Representative Signature      ..........................................................................................................................................  Patient Representative Print Name and Relationship to Patient    ..................................................               ................................................  Date                                            Time    ..........................................................................................................................................  Reviewed by Signature/Title    ...................................................              ..............................................  Date                                                            Time

## 2017-02-07 ENCOUNTER — APPOINTMENT (OUTPATIENT)
Dept: ULTRASOUND IMAGING | Facility: CLINIC | Age: 67
DRG: 193 | End: 2017-02-07
Attending: FAMILY MEDICINE
Payer: COMMERCIAL

## 2017-02-07 LAB
ALBUMIN SERPL-MCNC: 2.8 G/DL (ref 3.4–5)
ALP SERPL-CCNC: 58 U/L (ref 40–150)
ALT SERPL W P-5'-P-CCNC: 35 U/L (ref 0–50)
ANION GAP SERPL CALCULATED.3IONS-SCNC: 7 MMOL/L (ref 3–14)
AST SERPL W P-5'-P-CCNC: 52 U/L (ref 0–45)
BASOPHILS # BLD AUTO: 0 10E9/L (ref 0–0.2)
BASOPHILS NFR BLD AUTO: 0 %
BILIRUB SERPL-MCNC: 0.6 MG/DL (ref 0.2–1.3)
BUN SERPL-MCNC: 12 MG/DL (ref 7–30)
CALCIUM SERPL-MCNC: 8.1 MG/DL (ref 8.5–10.1)
CHLORIDE SERPL-SCNC: 107 MMOL/L (ref 94–109)
CO2 SERPL-SCNC: 27 MMOL/L (ref 20–32)
CREAT SERPL-MCNC: 0.73 MG/DL (ref 0.52–1.04)
DIFFERENTIAL METHOD BLD: ABNORMAL
EOSINOPHIL # BLD AUTO: 0.2 10E9/L (ref 0–0.7)
EOSINOPHIL NFR BLD AUTO: 3 %
ERYTHROCYTE [DISTWIDTH] IN BLOOD BY AUTOMATED COUNT: 12.6 % (ref 10–15)
GFR SERPL CREATININE-BSD FRML MDRD: 79 ML/MIN/1.7M2
GLUCOSE SERPL-MCNC: 81 MG/DL (ref 70–99)
HCT VFR BLD AUTO: 40.4 % (ref 35–47)
HGB BLD-MCNC: 13.9 G/DL (ref 11.7–15.7)
LYMPHOCYTES # BLD AUTO: 1.1 10E9/L (ref 0.8–5.3)
LYMPHOCYTES NFR BLD AUTO: 14 %
MCH RBC QN AUTO: 33.3 PG (ref 26.5–33)
MCHC RBC AUTO-ENTMCNC: 34.4 G/DL (ref 31.5–36.5)
MCV RBC AUTO: 97 FL (ref 78–100)
MONOCYTES # BLD AUTO: 0.6 10E9/L (ref 0–1.3)
MONOCYTES NFR BLD AUTO: 8 %
NEUTROPHILS # BLD AUTO: 6 10E9/L (ref 1.6–8.3)
NEUTROPHILS NFR BLD AUTO: 75 %
PLATELET # BLD AUTO: 152 10E9/L (ref 150–450)
PLATELET # BLD EST: NORMAL 10*3/UL
POTASSIUM SERPL-SCNC: 3.7 MMOL/L (ref 3.4–5.3)
PROT SERPL-MCNC: 7.1 G/DL (ref 6.8–8.8)
RBC # BLD AUTO: 4.17 10E12/L (ref 3.8–5.2)
RBC MORPH BLD: NORMAL
SODIUM SERPL-SCNC: 141 MMOL/L (ref 133–144)
TSH SERPL DL<=0.005 MIU/L-ACNC: 0.92 MU/L (ref 0.4–4)
WBC # BLD AUTO: 8 10E9/L (ref 4–11)

## 2017-02-07 PROCEDURE — 25000128 H RX IP 250 OP 636: Performed by: FAMILY MEDICINE

## 2017-02-07 PROCEDURE — 99232 SBSQ HOSP IP/OBS MODERATE 35: CPT | Performed by: FAMILY MEDICINE

## 2017-02-07 PROCEDURE — 76536 US EXAM OF HEAD AND NECK: CPT

## 2017-02-07 PROCEDURE — 80053 COMPREHEN METABOLIC PANEL: CPT | Performed by: FAMILY MEDICINE

## 2017-02-07 PROCEDURE — 25000125 ZZHC RX 250: Performed by: FAMILY MEDICINE

## 2017-02-07 PROCEDURE — 85025 COMPLETE CBC W/AUTO DIFF WBC: CPT | Performed by: FAMILY MEDICINE

## 2017-02-07 PROCEDURE — 12000000 ZZH R&B MED SURG/OB

## 2017-02-07 PROCEDURE — 94640 AIRWAY INHALATION TREATMENT: CPT

## 2017-02-07 PROCEDURE — 25000132 ZZH RX MED GY IP 250 OP 250 PS 637: Performed by: FAMILY MEDICINE

## 2017-02-07 PROCEDURE — 84443 ASSAY THYROID STIM HORMONE: CPT | Performed by: FAMILY MEDICINE

## 2017-02-07 PROCEDURE — 36415 COLL VENOUS BLD VENIPUNCTURE: CPT | Performed by: FAMILY MEDICINE

## 2017-02-07 PROCEDURE — 94640 AIRWAY INHALATION TREATMENT: CPT | Mod: 76

## 2017-02-07 RX ADMIN — IPRATROPIUM BROMIDE AND ALBUTEROL SULFATE 3 ML: .5; 3 SOLUTION RESPIRATORY (INHALATION) at 15:34

## 2017-02-07 RX ADMIN — IPRATROPIUM BROMIDE AND ALBUTEROL SULFATE 3 ML: .5; 3 SOLUTION RESPIRATORY (INHALATION) at 19:55

## 2017-02-07 RX ADMIN — CEFTRIAXONE 2 G: 2 INJECTION, POWDER, FOR SOLUTION INTRAMUSCULAR; INTRAVENOUS at 13:14

## 2017-02-07 RX ADMIN — PREDNISONE 60 MG: 20 TABLET ORAL at 08:17

## 2017-02-07 RX ADMIN — AZITHROMYCIN 250 MG: 250 TABLET, FILM COATED ORAL at 13:12

## 2017-02-07 RX ADMIN — IPRATROPIUM BROMIDE AND ALBUTEROL SULFATE 3 ML: .5; 3 SOLUTION RESPIRATORY (INHALATION) at 09:17

## 2017-02-07 RX ADMIN — IPRATROPIUM BROMIDE AND ALBUTEROL SULFATE 3 ML: .5; 3 SOLUTION RESPIRATORY (INHALATION) at 12:31

## 2017-02-07 ASSESSMENT — ASTHMA QUESTIONNAIRES: ACT_TOTALSCORE: 11

## 2017-02-07 NOTE — H&P
[facility]    History and Physical  Hospitalist       Date of Admission:  2/6/2017  Date of Service (when I saw the patient): 02/06/2017    Assessment and Plan  Karen Guevara is a 66 year old female who presents with hypoxia from clinic.    Principal Problem:    Acute respiratory failure with hypoxia (H)    Assessment: likely secondary to both pneumonia in combination with asthma    Plan: prednisone 60 mg daily, duonebs four times daily with albuterol q2 prn.      Community acquired pneumonia    Assessment: viral vs bacterial given normal WBC    Plan: will cover for CAP with azithro and rocephin, first dose given in ER.     Active Problems:    Mild persistent asthma with exacerbation    Assessment: sounds poorly controlled at baseline    Plan: as above, consider allergy/asthma consultation as outpatient.      Cardiac Murmur  Assessment: no previous knowledge of murmur  Plan: consider echocardiogram, not  Yet ordered    Mild elevation of AST  Assessment: denies alcohol use, could be due to infection  Plan: recheck in am, further work up if necessary.  Elevation is mild.  With the RUQ pain/spasms, consider US to r/o gallstones.         DVT Prophylaxis: Low Risk/Ambulatory with no VTE prophylaxis indicated  Code Status: Full Code    Disposition: Expected discharge in 2-3 days once hypoxia resolved.    Grecia Cooper MD    Primary Care Physician  Lavern Jefferson    Chief Complaint  Shortness of breath    History is obtained from the patient and      History of Present Illness  Karen Guevara is a 66 year old female with history of asthma, hyperlipidemia and thyroid mass found to be benign (currently euthyroid) presents to the ED after being hypoxic and febrile in clinic with sats 86% on room air.  She notes that she has been coughing for 6 months, worse in the past 4-5 days and also febrile for the same length of time.  She has chronic asthma but isn't very compliant with her  "controller medications due to cost, side effects, etc.  She doesn't  Use home nebulizer due to feeling \"jittery\".      Symptoms of asthma seemed to worse last spring after they had a houseguest stay who had a small dog.  She also is not able to be around her adult children's animals for long.  She is not a smoker (never), last allergy testing was many years ago.  Doesn't follow with an allergist.      Pain across the upper abdomen for three months or so with right sided \"spasms\" of the abdominal muscles.  This is worse when she does a lot of coughing or twisting.      Past Medical History   I have reviewed this patient's medical history and updated it with pertinent information if needed.   Past Medical History   Diagnosis Date     Unspecified hypothyroidism      Allergic rhinitis due to other allergen      Vitiligo      Other and unspecified hyperlipidemia      Mild intermittent asthma        Patient Active Problem List    Diagnosis Date Noted     Community acquired pneumonia 02/06/2017     Priority: Medium     Acute respiratory failure with hypoxia (H) 02/06/2017     Priority: Medium     HYPERLIPIDEMIA LDL GOAL <160 10/31/2010     Priority: Medium     Left sided sciatica 05/10/2010     Priority: Medium     Consider SAM       Mild persistent asthma with exacerbation 11/24/2008     Priority: Medium     Hypothyroidism 10/15/2007     Priority: Medium     Took self off meds 2006  Problem list name updated by automated process. Provider to review       Plantar fascial fibromatosis 10/15/2007     Priority: Medium     Mixed hyperlipidemia 09/12/2007     Priority: Medium     HYPOPIGMENTATION 01/11/2007     Priority: Medium         Past Surgical History  I have reviewed this patient's surgical history and updated it with pertinent information if needed.  Past Surgical History   Procedure Laterality Date     Surgical history of -   1989     partial Lt thyroidectomy       Prior to Admission Medications  Prior to Admission " Medications   Prescriptions Last Dose Informant Patient Reported? Taking?   CHOLEST OFF OR More than a month at Unknown time Self Yes No   Sig: take one tablet by mouth when eating a meal that is fatty   Coenzyme Q-10 capsule Past Month at Unknown time Self Yes Yes   Sig: Take 1 capsule by mouth daily as needed    Misc Natural Products (DAILY HERBS IMMUNE DEFENSE PO)  Self Yes Yes   Sig: Take 2 capsules by mouth every evening Immune System Defense with IP-6.  Supports healthy cell development   OIL OF OREGANO PO Past Month at Unknown time Self Yes Yes   VITAMIN A PO Past Month at Unknown time Self Yes Yes   Sig: Take 1 capsule by mouth daily    VITAMIN D 1000 UNIT OR TABS Past Month at on hold Self Yes Yes   Si TABLET DAILY   Zinc 25 MG TABS Past Month at on hold Self Yes Yes   Sig: Take 0.5 tablets by mouth daily Taking 1/2 tablet daily.   albuterol (VENTOLIN HFA) 108 (90 BASE) MCG/ACT inhaler 2017 at am Self No Yes   Sig: Inhale 2 puffs into the lungs every 4 hours as needed for shortness of breath / dyspnea or wheezing   fexofenadine (ALLEGRA) 180 MG tablet Past Week at lunch Self Yes Yes   Sig: Take 1 tablet (180 mg) by mouth daily   fluticasone (FLONASE) 50 MCG/ACT nasal spray  Self Yes Yes   Sig: Spray 1 spray into both nostrils 2 times daily    predniSONE (DELTASONE) 20 MG tablet 2017 at Unknown time Self No Yes   Sig: Take 3 tabs (60 mg) by mouth daily x 3 days, 2 tabs (40 mg) daily x 3 days, 1 tab (20 mg) daily x 3 days, then 1/2 tab (10 mg) x 3 days.   vitamin B complex with vitamin C (VITAMIN  B COMPLEX) TABS tablet Past Month at on hold Self Yes Yes   Sig: Take 1 tablet by mouth daily      Facility-Administered Medications: None     Allergies  Allergies   Allergen Reactions     Amoxicillin Nausea and Vomiting     Claritin [Loratadine] Hives       Social History  I have reviewed this patient's social history and updated it with pertinent information if needed. Karen Guevara  reports that she  has never smoked. She has never used smokeless tobacco. She reports that she does not drink alcohol or use illicit drugs.   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   She is a non-smoker  She has two adult daughters    Family History  I have reviewed this patient's family history and updated it with pertinent information if needed.   Family History   Problem Relation Age of Onset     CANCER Mother      myelodysplasia, diabetes     Hypertension Father      HEART DISEASE Father      DIABETES Brother      Respiratory Daughter      HEART DISEASE Brother      sudden death       Review of Systems  The 10 point Review of Systems is negative other than noted in the HPI or here.      Physical Exam  Temp: 99.6  F (37.6  C) Temp src: Oral BP: 121/72 mmHg Pulse: 90 Heart Rate: 90 Resp: 20 SpO2: 94 % O2 Device: None (Room air)    Vital Signs with Ranges  Temp:  [99.6  F (37.6  C)-101  F (38.3  C)] 99.6  F (37.6  C)  Pulse:  [90-93] 90  Heart Rate:  [90] 90  Resp:  [20-24] 20  BP: (105-133)/(65-73) 121/72 mmHg  SpO2:  [86 %-95 %] 94 %  121 lbs 0 oz    Constitutional: alert, able to speak in complete sentences.  Sitting up in chair, no acute distress  Eyes: PERRL, sclera non-icteric  HEENT: oral mucous membranes are moist  Respiratory: coarse breath sounds throughout and diffuse wheezing.    Cardiovascular: regular rate and rhythm with 3/6 systolic murmur heard best at the apex  GI: soft/nt/nd  Lymph/Hematologic: no enlarged nodes  Genitourinary: deferred  Skin: irregular pigmentation of face  Musculoskeletal: moves all extremities well  Neurologic: CN II-XII grossly intact  Psychiatric: alert, oriented x 3, pleasant     Data  Data reviewed today:  I personally reviewed the chest x-ray image(s) showing bibasilar infiltrates.    Recent Labs  Lab 02/06/17  1337   WBC 9.1   HGB 14.2   MCV 97         POTASSIUM 3.8   CHLORIDE 103   CO2 29   BUN 14   CR 0.86   ANIONGAP 6   JOSH 8.1*   *   ALBUMIN  3.0*   PROTTOTAL 7.3   BILITOTAL 0.7   ALKPHOS 60   ALT 35   AST 54*       Recent Results (from the past 24 hour(s))   XR Chest 2 Views    Narrative    XR CHEST 2 VW 2/6/2017 2:04 PM    HISTORY: Short of breath, fever.    COMPARISON: 12/17/2008    FINDINGS: Bilateral basilar airspace opacity. No pleural effusion or  pneumothorax. Normal heart size.      Impression    IMPRESSION: Basilar pneumonia.    MARIAH BROOKS MD   Chest CT - IV contrast only - PE protocol    Narrative    CT CHEST PULMONARY EMBOLISM W CONTRAST 2/6/2017 3:10 PM    HISTORY: Short of breath. Elevated d-dimer.    CONTRAST:  70mL Isovue-370.    TECHNIQUE: CT of the chest is performed with IV contrast per pulmonary  embolus protocol.    This study is particularly tailored to assess the pulmonary arteries  and their branch vessels.  Other assessed structures include the  lungs, mediastinum, pleura, and chest wall.    Radiation dose for this scan is reduced using automated exposure  control, adjustment of the mA and/or kV according to patient size, or  iterative reconstruction technique.    COMPARISON: None.    FINDINGS: Assessment of the right lung shows mixed interstitial and  nodular infiltrates, especially of the right lower lobe where more  confluent abnormality is present. Mild traction bronchiectasis in the  right middle lobe and right lower lobe are suggested. Assessment the  left lung shows interstitial and nodular infiltrates, mainly in the  left lower lobe. More confluent opacity is seen posteromedially in the  left lower lobe. Mild traction bronchiectasis is present in the  lingula and left lower lobe. Bilateral hilar and subcarinal adenopathy  are present. A dominant right hilar lymph node on image #52 measures  1.2 cm. A subcarinal lymph node on image #57 measures 1.1 cm. A left  hilar lymph node on image #60 measures 1 cm. The thoracic aorta is  normal in caliber with no dissection. There is a 3.5 cm cyst or nodule  in the right lobe of  the thyroid gland. The upper visualized portions  of the abdomen show 0.8 cm right lobe liver lesion on image #114. A  cyst is favored.      Impression    IMPRESSION:  1.  Bilateral lung parenchymal abnormalities are present, especially  in the lower lung zones. Inflammatory/infectious etiologies are  favored.  2. Mild mediastinal and hilar adenopathy are present. They are  nonspecific. They could be reactive, given the lung findings.  3. No CT evidence of pulmonary embolus.    DAVID GONGORA MD

## 2017-02-07 NOTE — PLAN OF CARE
Problem: Goal Outcome Summary  Goal: Goal Outcome Summary  Outcome: Improving    Patient ambulating short distances in hallway on room air. Denies shortenss of air with walking, but states she feels weak. Currently sitting up in room visiting with .

## 2017-02-07 NOTE — PLAN OF CARE
Problem: Goal Outcome Summary  Goal: Goal Outcome Summary    Patient has been on room air all day. When ambulating this afternoon, oxygen level dropped to 88%. When back in room and sitting, oxygen returned to 93% slowly. Updated Dr. Sainz and his student, Jd.

## 2017-02-07 NOTE — PLAN OF CARE
"[facility] ADMISSION NOTE    Patient admitted to room 2303 at approximately 2140 via cart from emergency room. Patient was accompanied by spouse and transport tech.     Verbal SBAR report received from SE Jesus prior to patient arrival.     Patient ambulated to bed independently. Patient alert and oriented X 3. The patient is not having any pain. 0-10 Pain Scale: 0. Admission vital signs: Blood pressure 113/74, pulse 90, temperature 99.6  F (37.6  C), temperature source Oral, resp. rate 20, height 1.499 m (4' 11\"), weight 54.885 kg (121 lb), SpO2 98 %. Patient was oriented to plan of care, call light, bed controls, tv, telephone, bathroom and visiting hours.     The following safety risks were identified during admission: none. Yellow risk band applied: Myrna Mckeon RN      "

## 2017-02-07 NOTE — PLAN OF CARE
"Problem: Goal Outcome Summary  Goal: Goal Outcome Summary  Outcome: No Change  A/Ox4. VSS. Denies discomfort.  Up independently.  O2 sats 96% on 1LNC.   at bedside. /75 mmHg  Pulse 69  Temp(Src) 97.8  F (36.6  C) (Oral)  Resp 18  Ht 1.499 m (4' 11\")  Wt 57.4 kg (126 lb 8.7 oz)  BMI 25.55 kg/m2  SpO2 96%        "

## 2017-02-07 NOTE — PROGRESS NOTES
[facility]    Hospitalist Progress Note    Date of Service (when Attending saw the patient): 02/07/2017    Interval History  Difficulty sleeping last night, she believes mostly because of the albuterol.  Cough still active subjectively feels less wheezing.  Reports decreased chills, no nausea or vomiting, diarrhea, constipation and admits some anxiety today, blaming the albuterol for that.  Otherwise she is feeling significantly improved compared to yesterday.  Currently oxygenating well on room air.     Of note, daughter present in the room on re-check this afternoon.  Daughter reports her mother has had ongoing lung infections with wheezes for at least the last nine months.  Her mother states she has been treating her lung issues with oregano oil.      Assessment and Plan  Medical Student Note Attending Note   Assessment and Plan (Student)    Acute respiratory failure with hypoxia (H)  Assessment: likely secondary to both pneumonia in combination with asthma  Plan: prednisone 60 mg daily, duonebs four times daily with albuterol q2 prn.    2/7/17: Feel like she is breathing more easily. Not as short of breath with ambulation as yesterday. Continue with 60 mg prednisone daily, duonebs 4x per day.  And albuterol q2hrs prn for SOB. Makes her jittery and didn't allow her to sleep  Community acquired pneumonia  Assessment: viral vs bacterial given normal WBC  Plan: will cover for CAP with azithro and rocephin, first dose given in ER.   2/7/17:  Continue current antibiotics, tolerating well w/o diarrhea or allergic reaction    Active Problems:  Mild persistent asthma with exacerbation  Assessment: sounds poorly controlled at baseline  Plan: as above, consider allergy/asthma consultation as outpatient.    2/7/17: bilateral diffuse wheezes and crackles, resolving.  New information suggests her asthma-like symptoms are much worse chronically than she had explained.  This includes nine months of  "cough with SOB.  PCP may consider PFTs and a review of asthma-like symptoms.    Cardiac Murmur  Assessment: no previous knowledge of murmur  Plan: consider echocardiogram, not  Yet ordered  2/7/17: Follow up with PCP to discuss possible outpatient echocardiogram.    Mild elevation of AST  Assessment: denies alcohol use, could be due to infection  Plan: recheck in am, further work up if necessary.  Elevation is mild.  With the RUQ pain/spasms, consider US to r/o gallstones.    2/7/17: AST decreased from 54 to 52, no elevation in bilirubin yesterday or today.  Will defer US of abdomen. Admits history of spasm of right and left upper abdominal muscles when coughing a lot. Recheck liver enzymes with PCP    Thyroid Nodule: There is a 3.5 cm cyst or nodule  in the right lobe of the thyroid gland that was detected on chest CT 2/6/17.  2/7/17:  Patient admits recent weight loss of 4lbs in last week due to illness and loss of appetite.  She also admits to feelings of a racing heart.  Not sure if this is related.  Will order TSH as well.  Was on synthroid and on every yearly physical while not taking synthroid.  She has not been on synthroid for several years and a partial thyroidectomy several years ago (1980s) and it was found to be benign on biopsy. Sister had \"laser therapy of thyroid\", unknown why.  Will wait for results of TSH levels.  Consider US of thyroid outpatient at PCP's discretion.     DVT Prophylaxis: Low Risk/Ambulatory with no VTE prophylaxis indicated    Code Status: Full Code    Disposition: Expected discharge in 1-2 days once hypoxia resolved.     Patient reports feeling significantly improved, but still winded with any activity and sats still drop with ambulation.  No pain, no fever or chills, no new concerns.      Assessment and Plan (Attending)    Acute respiratory failure with hypoxia (H) due to pneumonia and asthma as below  Treat asthma and pneumonia as below.     Community acquired " pneumonia  2/7/2017 --  cover for CAP with azithro and rocephin    Mild persistent asthma with exacerbation  2/6/17 -- sounds poorly controlled at baseline  2/7/17:  New information suggests her asthma-like symptoms are much worse chronically than she had explained.  This includes nine months of cough with SOB.  Continue prednisone and nebs for now, plan for taper on discharge - will need outpatient pulmonary function testing with primary care provider (discussed with patient) and likely daily controller med although patient highly resistant to this idea.    Systolic Cardiac Murmur  2/7/2017 -- no longer audible this afternoon - suspect flow murmur - follow-up with primary care provider, could consider outpatient echo if persists.      Mild elevation of AST  2/7/2017 -- minimally up - no symptoms - recheck labs at follow-up with primary care provider but no intervention needed at this time for minimal elevation.      Thyroid Nodule:  2/6/17 --  There is a 3.5 cm cyst or nodule  in the right lobe of the thyroid gland that was detected on chest CT 2/6/17.  2/7/17:  Patient admits recent weight loss of 4lbs in last week due to illness and loss of appetite.  She also admits to feelings of a racing heart.  Not sure if this is related.  Will order TSH as well.  She has not been on synthroid for several years and had a partial thyroidectomy several years ago (1980s) - found to be benign on biopsy. UPDATE: TSH normal, checking US of thyroid overnight.     DVT Prophylaxis:   2/7/2017 -- Low Risk/Ambulatory with no VTE prophylaxis indicated - ambulating well and frequently by her and spouse's report    Code Status: Full Cod    Physical Exam (Student)  General: A&Ox3, not toxic appearing, in no acute distress  Head: Normocephalic/atraumatic.  Eyes: EOM grossly intact, PERRLA   Ears: EAC clear, tympanic membranes intact, flat and light reflex intact  Nose: No septal deviation, membranes moist  Throat/mouth: No lesions on oral  mucousa, good dentition without signs of caries, no posterior pharyngeal erythema or petechiae   Neck: Trachea midline, no anterior or posterior cervical adenopathy.  Mass present on right side of thyroid.  Lungs: Respirations easy, Wheezes and crackles bilaterally with good chest wall excursion  Heart: RRR w/o rubs, mild systolic murmur, S3 or S4  Abdomen: Bowel sounds active, no tenderness to palpation  Integument: Intact, no rashes, no edema or sores present. Hypopigmentation on skin all over body, particularly on face and back.          Physical Exam (Attending)  EXAM:  General: awake and alert, NAD, oriented x 3  Head: normocephalic  Neck: unremarkable, no lymphadenopathy   HEENT: oropharynx pink and moist    Heart: Regular rate and rhythm, no murmurs, rubs, or gallops  Lungs: expiratory wheezes but good air movement throughout, no focal changes.    Abdomen: soft, non-tender, no masses or organomegaly  Extremities: no edema in lower extremities   Skin unremarkable.       Data Interpretation  I have reviewed today's vital signs, medications, labs and imaging     Harvey Sainz MD 2/7/2017 5:24 PM     Medications       cefTRIAXone  2 g Intravenous Q24H     azithromycin  250 mg Oral Q24H     ipratropium - albuterol 0.5 mg/2.5 mg/3 mL  3 mL Nebulization 4x Daily     predniSONE  60 mg Oral Daily     influenza Vac Split High-Dose  0.5 mL Intramuscular Prior to discharge       Data    Recent Labs  Lab 02/07/17  0809 02/06/17  1337   WBC 8.0 9.1   HGB 13.9 14.2   MCV 97 97    153    138   POTASSIUM 3.7 3.8   CHLORIDE 107 103   CO2 27 29   BUN 12 14   CR 0.73 0.86   ANIONGAP 7 6   JOSH 8.1* 8.1*   GLC 81 106*   ALBUMIN 2.8* 3.0*   PROTTOTAL 7.1 7.3   BILITOTAL 0.6 0.7   ALKPHOS 58 60   ALT 35 35   AST 52* 54*       Recent Results (from the past 24 hour(s))   XR Chest 2 Views    Narrative    XR CHEST 2 VW 2/6/2017 2:04 PM    HISTORY: Short of breath, fever.    COMPARISON: 12/17/2008    FINDINGS: Bilateral  basilar airspace opacity. No pleural effusion or  pneumothorax. Normal heart size.      Impression    IMPRESSION: Basilar pneumonia.    MARIAH BROOKS MD   Chest CT - IV contrast only - PE protocol    Addendum: 2/7/2017    SAVANNAH PAREDES  Accession # [id number]    The original report on this patient was dictated by Dr. Myrick.      Ultrasound is recommended in further assessment of the right thyroid  lesion described in the original report.    Harvey Myrick MD ( Date of Addendum: 2/6/2017 )    HARVEY MYRICK MD      Narrative    CT CHEST PULMONARY EMBOLISM W CONTRAST 2/6/2017 3:10 PM    HISTORY: Short of breath. Elevated d-dimer.    CONTRAST:  70mL Isovue-370.    TECHNIQUE: CT of the chest is performed with IV contrast per pulmonary  embolus protocol.    This study is particularly tailored to assess the pulmonary arteries  and their branch vessels.  Other assessed structures include the  lungs, mediastinum, pleura, and chest wall.    Radiation dose for this scan is reduced using automated exposure  control, adjustment of the mA and/or kV according to patient size, or  iterative reconstruction technique.    COMPARISON: None.    FINDINGS: Assessment of the right lung shows mixed interstitial and  nodular infiltrates, especially of the right lower lobe where more  confluent abnormality is present. Mild traction bronchiectasis in the  right middle lobe and right lower lobe are suggested. Assessment the  left lung shows interstitial and nodular infiltrates, mainly in the  left lower lobe. More confluent opacity is seen posteromedially in the  left lower lobe. Mild traction bronchiectasis is present in the  lingula and left lower lobe. Bilateral hilar and subcarinal adenopathy  are present. A dominant right hilar lymph node on image #52 measures  1.2 cm. A subcarinal lymph node on image #57 measures 1.1 cm. A left  hilar lymph node on image #60 measures 1 cm. The thoracic aorta is  normal in caliber with no dissection. There is  a 3.5 cm cyst or nodule  in the right lobe of the thyroid gland. The upper visualized portions  of the abdomen show 0.8 cm right lobe liver lesion on image #114. A  cyst is favored.      Impression    IMPRESSION:  1.  Bilateral lung parenchymal abnormalities are present, especially  in the lower lung zones. Inflammatory/infectious etiologies are  favored.  2. Mild mediastinal and hilar adenopathy are present. They are  nonspecific. They could be reactive, given the lung findings.  3. No CT evidence of pulmonary embolus.    DAVID GONGORA MD

## 2017-02-08 VITALS
OXYGEN SATURATION: 90 % | DIASTOLIC BLOOD PRESSURE: 68 MMHG | TEMPERATURE: 97.9 F | WEIGHT: 126.54 LBS | RESPIRATION RATE: 18 BRPM | HEART RATE: 77 BPM | BODY MASS INDEX: 25.51 KG/M2 | SYSTOLIC BLOOD PRESSURE: 115 MMHG | HEIGHT: 59 IN

## 2017-02-08 PROCEDURE — 94640 AIRWAY INHALATION TREATMENT: CPT | Mod: 76

## 2017-02-08 PROCEDURE — 25000132 ZZH RX MED GY IP 250 OP 250 PS 637: Performed by: FAMILY MEDICINE

## 2017-02-08 PROCEDURE — 25000128 H RX IP 250 OP 636: Performed by: FAMILY MEDICINE

## 2017-02-08 PROCEDURE — 99239 HOSP IP/OBS DSCHRG MGMT >30: CPT | Performed by: FAMILY MEDICINE

## 2017-02-08 PROCEDURE — 90471 IMMUNIZATION ADMIN: CPT

## 2017-02-08 PROCEDURE — 90662 IIV NO PRSV INCREASED AG IM: CPT | Performed by: FAMILY MEDICINE

## 2017-02-08 PROCEDURE — 25000125 ZZHC RX 250: Performed by: FAMILY MEDICINE

## 2017-02-08 PROCEDURE — 94640 AIRWAY INHALATION TREATMENT: CPT

## 2017-02-08 RX ORDER — ALBUTEROL SULFATE 90 UG/1
2 AEROSOL, METERED RESPIRATORY (INHALATION) EVERY 4 HOURS PRN
Qty: 1 INHALER | Refills: 11 | Status: SHIPPED | OUTPATIENT
Start: 2017-02-08

## 2017-02-08 RX ORDER — PREDNISONE 10 MG/1
TABLET ORAL
Qty: 38 TABLET | Refills: 0 | Status: SHIPPED | OUTPATIENT
Start: 2017-02-08 | End: 2017-02-23

## 2017-02-08 RX ORDER — AZITHROMYCIN 250 MG/1
250 TABLET, FILM COATED ORAL DAILY
Qty: 5 TABLET | Refills: 0 | Status: SHIPPED | OUTPATIENT
Start: 2017-02-08 | End: 2017-02-15

## 2017-02-08 RX ADMIN — AZITHROMYCIN 250 MG: 250 TABLET, FILM COATED ORAL at 12:39

## 2017-02-08 RX ADMIN — IPRATROPIUM BROMIDE AND ALBUTEROL SULFATE 3 ML: .5; 3 SOLUTION RESPIRATORY (INHALATION) at 11:30

## 2017-02-08 RX ADMIN — INFLUENZA A VIRUS A/CALIFORNIA/7/2009 X-179A (H1N1) ANTIGEN (FORMALDEHYDE INACTIVATED), INFLUENZA A VIRUS A/HONG KONG/4801/2014 X-263B (H3N2) ANTIGEN (FORMALDEHYDE INACTIVATED), AND INFLUENZA B VIRUS B/BRISBANE/60/2008 ANTIGEN (FORMALDEHYDE INACTIVATED) 0.5 ML: 60; 60; 60 INJECTION, SUSPENSION INTRAMUSCULAR at 10:25

## 2017-02-08 RX ADMIN — PREDNISONE 60 MG: 20 TABLET ORAL at 08:25

## 2017-02-08 RX ADMIN — IPRATROPIUM BROMIDE AND ALBUTEROL SULFATE 3 ML: .5; 3 SOLUTION RESPIRATORY (INHALATION) at 07:37

## 2017-02-08 NOTE — PLAN OF CARE
Problem: Pneumonia (Adult)  Goal: Signs and Symptoms of Listed Potential Problems Will be Absent or Manageable (Pneumonia)  Signs and symptoms of listed potential problems will be absent or manageable by discharge/transition of care (reference Pneumonia (Adult) CPG).  Outcome: Improving  Pt on RA sats 94%. Ambulating around room independently. Denies pain except states occasionally gets muscle spasm type pain in her rib area when coughing. Cough is infrequent and nonproductive. Pt states she slept poorly last night due to feeling jittery from the nebs. Plan for tonight will be to hold the night time scheduled neb tx, unless pt starts to feel SOA, then she will call RN. , Javi, is planning to spend the night in the room with her.

## 2017-02-08 NOTE — PLAN OF CARE
"Problem: Pneumonia (Adult)  Goal: Signs and Symptoms of Listed Potential Problems Will be Absent or Manageable (Pneumonia)  Signs and symptoms of listed potential problems will be absent or manageable by discharge/transition of care (reference Pneumonia (Adult) CPG).   Outcome: Improving  /74 mmHg  Pulse 83  Temp(Src) 97.8  F (36.6  C) (Oral)  Resp 18  Ht 1.499 m (4' 11\")  Wt 57.4 kg (126 lb 8.7 oz)  BMI 25.55 kg/m2  SpO2 97%    Pt continues to be treated for pneumonia with nebs, oral steroids and po/IV antibiotics. Pt stable on room air and does tolerate walking in hallway on room air. Pt states her cough is less frequent and now productive with small white sputum. Pt LS with crackles in lower lobes. No noted wheezing present. Pt denies pain with interactions and has slept well overnight.         "

## 2017-02-08 NOTE — DISCHARGE SUMMARY
[facility]    Discharge Summary  Hospital Medicine    Date of Admission:  2/6/2017  Date of Discharge:  2/8/2017   Discharging Provider: Vernon Cruz  Date of Service: 2/8/2017     Primary Care     Lavern Jefferson  [facility] [address] [address]      Identification and Chief Compaint: Karen Guevara is a 66 year old female who presented on 2/6/2017 with complaint of shortness of air.    Discharge Diagnoses      Acute respiratory failure with hypoxia (H)    Mild persistent asthma with exacerbation    Community acquired pneumonia    Acute hypoxemic respiratory failure (H)    * No resolved hospital problems. *          Discharge Disposition  Discharged to home    Discharge Orders  No discharge procedures on file.  Discharge Medications  Current Discharge Medication List      START taking these medications    Details   azithromycin (ZITHROMAX) 250 MG tablet Take 1 tablet (250 mg) by mouth daily One tablet daily for 6 days beginning on Tuesday 2/7/17 (1st dose given in the ER).  Qty: 5 tablet, Refills: 0    Associated Diagnoses: Community acquired pneumonia         CONTINUE these medications which have CHANGED    Details   albuterol (VENTOLIN HFA) 108 (90 BASE) MCG/ACT Inhaler Inhale 2 puffs into the lungs every 4 hours as needed for shortness of breath / dyspnea or wheezing  Qty: 1 Inhaler, Refills: 11    Associated Diagnoses: Mild persistent asthma with exacerbation      predniSONE (DELTASONE) 10 MG tablet Take 4 tabs by mouth daily x 5 days, then 3 tabs daily x 3 days, then 2 tabs daily x 3 days then 1 tab daily x 3 days then stop.  Qty: 38 tablet, Refills: 0    Associated Diagnoses: Asthma exacerbation      albuterol (2.5 MG/3ML) 0.083% neb solution Take 1 vial (2.5 mg) by nebulization every 4 hours as needed for shortness of breath / dyspnea or wheezing  Qty: 1 Box, Refills: 1    Associated Diagnoses: Asthma exacerbation         CONTINUE these  medications which have NOT CHANGED    Details   VITAMIN A PO Take 1 capsule by mouth daily       Misc Natural Products (DAILY HERBS IMMUNE DEFENSE PO) Take 2 capsules by mouth every evening Immune System Defense with IP-6.  Supports healthy cell development      vitamin B complex with vitamin C (VITAMIN  B COMPLEX) TABS tablet Take 1 tablet by mouth daily      fexofenadine (ALLEGRA) 180 MG tablet Take 1 tablet (180 mg) by mouth daily  Qty: 30 tablet, Refills: 1      fluticasone (FLONASE) 50 MCG/ACT nasal spray Spray 1 spray into both nostrils 2 times daily   Qty: 1 Bottle, Refills: 11      OIL OF OREGANO PO       Zinc 25 MG TABS Take 0.5 tablets by mouth daily Taking 1/2 tablet daily.      Coenzyme Q-10 capsule Take 1 capsule by mouth daily as needed       VITAMIN D 1000 UNIT OR TABS 1 TABLET DAILY      CHOLEST OFF OR take one tablet by mouth when eating a meal that is fatty           Allergies  Allergies   Allergen Reactions     Amoxicillin Nausea and Vomiting     Claritin [Loratadine] Hives       Consultations This Hospital Stay  None   CARE TRANSITION RN/SW IP CONSULT    Significant Results and Procedures  Procedures    None    Data  Results for orders placed or performed during the hospital encounter of 02/06/17   XR Chest 2 Views    Narrative    XR CHEST 2 VW 2/6/2017 2:04 PM    HISTORY: Short of breath, fever.    COMPARISON: 12/17/2008    FINDINGS: Bilateral basilar airspace opacity. No pleural effusion or  pneumothorax. Normal heart size.      Impression    IMPRESSION: Basilar pneumonia.    MARIAH BROOKS MD   Chest CT - IV contrast only - PE protocol    Addendum: 2/7/2017    SAVANNAH PAREDES  Accession # [id number]    The original report on this patient was dictated by Dr. Myrick.      Ultrasound is recommended in further assessment of the right thyroid  lesion described in the original report.    Harvey Myrick MD ( Date of Addendum: 2/6/2017 )    HARVEY MYRICK MD      Narrative    CT CHEST PULMONARY EMBOLISM W  CONTRAST 2/6/2017 3:10 PM    HISTORY: Short of breath. Elevated d-dimer.    CONTRAST:  70mL Isovue-370.    TECHNIQUE: CT of the chest is performed with IV contrast per pulmonary  embolus protocol.    This study is particularly tailored to assess the pulmonary arteries  and their branch vessels.  Other assessed structures include the  lungs, mediastinum, pleura, and chest wall.    Radiation dose for this scan is reduced using automated exposure  control, adjustment of the mA and/or kV according to patient size, or  iterative reconstruction technique.    COMPARISON: None.    FINDINGS: Assessment of the right lung shows mixed interstitial and  nodular infiltrates, especially of the right lower lobe where more  confluent abnormality is present. Mild traction bronchiectasis in the  right middle lobe and right lower lobe are suggested. Assessment the  left lung shows interstitial and nodular infiltrates, mainly in the  left lower lobe. More confluent opacity is seen posteromedially in the  left lower lobe. Mild traction bronchiectasis is present in the  lingula and left lower lobe. Bilateral hilar and subcarinal adenopathy  are present. A dominant right hilar lymph node on image #52 measures  1.2 cm. A subcarinal lymph node on image #57 measures 1.1 cm. A left  hilar lymph node on image #60 measures 1 cm. The thoracic aorta is  normal in caliber with no dissection. There is a 3.5 cm cyst or nodule  in the right lobe of the thyroid gland. The upper visualized portions  of the abdomen show 0.8 cm right lobe liver lesion on image #114. A  cyst is favored.      Impression    IMPRESSION:  1.  Bilateral lung parenchymal abnormalities are present, especially  in the lower lung zones. Inflammatory/infectious etiologies are  favored.  2. Mild mediastinal and hilar adenopathy are present. They are  nonspecific. They could be reactive, given the lung findings.  3. No CT evidence of pulmonary embolus.    DAVID GONGORA MD   US Thyroid  "   Narrative    US THYROID 2/7/2017 8:33 PM     HISTORY: Right thyroid lesion seen on CT. Further assess.    COMPARISON: CT dated 2/6/2017.    FINDINGS: The right lobe of the thyroid gland measures 5.2 x 2.5 x 2.5  cm. The left lobe of the thyroid gland is surgically absent. There is  a predominantly solid mass in the mid to lower pole of the right lobe  that measures 3.4 x 2.3 x 3.5 cm. A smaller solid hyperechoic nodule  in the lower pole measures 1.1 x 0.6 x 0.8 cm.      Impression    IMPRESSION: There is a 3.5 cm right thyroid mass present.  Ultrasound-guided FNA is suggested in further assessment.    DAVID GONGORA MD       History of Present Illness  Karen Guevara is a 66 year old female with history of asthma, hyperlipidemia and thyroid mass found to be benign (currently euthyroid) presents to the ED after being hypoxic and febrile in clinic with sats 86% on room air.  She notes that she has been coughing for 6 months, worse in the past 4-5 days and also febrile for the same length of time.  She has chronic asthma but isn't very compliant with her controller medications due to cost, side effects, etc.  She doesn't  Use home nebulizer due to feeling \"jittery\".      Symptoms of asthma seemed to worse last spring after they had a houseguest stay who had a small dog.  She also is not able to be around her adult children's animals for long.  She is not a smoker (never), last allergy testing was many years ago.  Doesn't follow with an allergist.      Pain across the upper abdomen for three months or so with right sided \"spasms\" of the abdominal muscles.  This is worse when she does a lot of coughing or twisting.      -- today doing well , no new concerns.  Cough improving, no fever or chills. No pain.  Ambulating well on room air now today - feels ready for discharge.      ROS: 10 point ROS neg other than the symptoms noted above in the HPI.   EXAM:  General: awake and alert, NAD, oriented x 3  Head: " normocephalic  Neck: unremarkable, no lymphadenopathy   HEENT: oropharynx pink and moist    Heart: Regular rate and rhythm, no murmurs, rubs, or gallops  Lungs: still expiratory wheezes bilaterally but improved air movement and no respiratory distress.    Abdomen: soft, non-tender, no masses or organomegaly  Extremities: no edema in lower extremities   Skin unremarkable.       Hospital Course  Karen Guevara was admitted on 2/6/2017.  The following problems were addressed during her hospitalization:    Acute hypoxic respiratory failure  2/7/17 -- likely secondary to both pneumonia in combination with asthma -prednisone 60 mg daily, duonebs four times daily with albuterol q2 prn.    2/7/17: Feel like she is breathing more easily. Not as short of breath with ambulation as yesterday. Continue with 60 mg prednisone daily, duonebs 4x per day.  And albuterol q2hrs prn   2/8/17: More improvement today. Feels more well rested after getting some sleep last night.  Says breathing is improving and feels ready to go home and follow up outpatient.  See plan as below.      Community acquired pneumonia  Assessment: viral vs bacterial given normal WBC  Plan: will cover for CAP with azithro and rocephin, first dose given in ER.    2/7/17:  Continue current antibiotics, tolerating well w/o diarrhea or allergic reaction  2/8/2017 -- doing well - will discharge to copmlete course of azithromycin.      Mild persistent asthma with exacerbation  Assessment: sounds poorly controlled at baseline  Plan: as above, consider allergy/asthma consultation as outpatient.     2/7/17: bilateral diffuse wheezes and crackles, resolving.  New information suggests her asthma-like symptoms are much worse chronically than she had explained.  This includes nine months of cough with SOB.  PCP may consider PFTs and a review of asthma-like symptoms.  2/8/17: Ok for discharge with prednisone taper and albuterol nebs and inhaler as needed.  Follow-up with new PCP  in clinic in about 1 week.  Discussed that she will likely need outpatient pulmonary function testing and likely a daily controller medication of some sort - she is resistant to this but after long discussion sounds like she may be willing to try an inhaled steroid (such as fluticasone) if needed but wants to try just having the albuterol as needed for a short while after completing her taper.      Cardiac Murmur  2/8/2017 -- noted on day 1, but not since yesterday afternoon - follow-up with primary care provider - if murmur returns could consider outpatient echo but with systolic murmur that resolved with treatment I think this is likely a benign flow murmur.       Mild elevation of AST  Assessment: denies alcohol use, could be due to infection  Plan: recheck in am, further work up if necessary.  Elevation is mild.  With the RUQ pain/spasms, consider US to r/o gallstones.    2/7/17: AST decreased from 54 to 52, no elevation in bilirubin yesterday or today.  Will defer US of abdomen. Admits history of spasm of right and left upper abdominal muscles when coughing a lot. Recheck liver enzymes with PCP  2/8/17: no change.    Thyroid Mass :   There is a 3.5 mass in the right lobe of the thyroid gland that was detected on chest CT 2/6/17.  2/8/2017 -- patient has had weight loss but just in the past few days, no other symptoms - says the mass has been there for > 1 year, doesn't think it's changing - US confirmed solid mass and recommended FNA as next step - patient says she just wants to worry about her asthma at present but discussed in detail the potentially serious and even fatal outcome if this is a cancer and she does nothing about it - importance of proceeding with further work-up discussed in great detail with her and her spouse - they express understanding - will discuss scheduling FNA at follow-up appointment.     DVT Prophylaxis: Low Risk/Ambulatory with no VTE prophylaxis indicated    Code Status: Full  Code    Disposition: Expected discharge in 1-2 days once hypoxia resolved.    Pending Results  Unresulted Labs Ordered in the Past 30 Days of this Admission     No orders found from 12/9/2016 to 2/7/2017.          Physical Exam  Temp: 97.9  F (36.6  C) Temp src: Oral BP: 115/68 mmHg Pulse: 77 Heart Rate: 87 Resp: 18 SpO2: 90 % O2 Device: None (Room air)      Filed Vitals:    02/06/17 1245 02/07/17 0509   Weight: 54.885 kg (121 lb) 57.4 kg (126 lb 8.7 oz)           Total time on this discharge was 60.        Harvey Sainz MD

## 2017-02-08 NOTE — PLAN OF CARE
Problem: Discharge Planning  Goal: Discharge Planning (Adult, OB, Behavioral, Peds)  Outcome: Adequate for Discharge Date Met:  02/08/17  EVANGELINA PORTER DISCHARGE NOTE    Patient discharged to home at 1:31 PM via wheel chair. Accompanied by spouse and staff. Discharge instructions reviewed with patient and spouse, opportunity offered to ask questions. Prescriptions sent to patients preferred pharmacy. All belongings sent with patient.    Tennille Soresnon

## 2017-02-09 ENCOUNTER — TELEPHONE (OUTPATIENT)
Dept: FAMILY MEDICINE | Facility: CLINIC | Age: 67
End: 2017-02-09

## 2017-02-09 NOTE — TELEPHONE ENCOUNTER
"Hospital/TCU/ED for chronic condition Discharge Protocol    \"Hi, my name is Maria Guadalupe Chaudhary, a registered nurse, and I am calling from [facility].  I am calling to follow up and see how things are going for you after your recent emergency visit/hospital/TCU stay.\"    Tell me how you are doing now that you are home?\" Patient reports that she feels like she is getting better day by day.  Taking antibiotics, prednisone, and doing breathing treatments as prescribed.      Discharge Instructions    \"Let's review your discharge instructions.  What is/are the follow-up recommendations?  Pt. Response: Follow up in clinic.    \"Has an appointment with your primary care provider been scheduled?\"   Yes. (confirm)    \"When you see the provider, I would recommend that you bring your medications with you.\"    Medications    \"Tell me what changed about your medicines when you discharged?\"    Changes to chronic meds?    0-1    \"What questions do you have about your medications?\"    None     New diagnoses of heart failure, COPD, diabetes, or MI?    No              Medication reconciliation completed? Yes  Was MTM referral placed (*Make sure to put transitions as reason for referral)?   No    Call Summary    \"What questions or concerns do you have about your recent visit and your follow-up care?\"     none    \"If you have questions or things don't continue to improve, we encourage you contact us through the main clinic number (give number).  Even if the clinic is not open, triage nurses are available 24/7 to help you.     We would like you to know that our clinic has extended hours (provide information).  We also have urgent care (provide details on closest location and hours/contact info)\"      \"Thank you for your time and take care!\"               "

## 2017-02-15 ENCOUNTER — OFFICE VISIT (OUTPATIENT)
Dept: FAMILY MEDICINE | Facility: CLINIC | Age: 67
End: 2017-02-15
Payer: COMMERCIAL

## 2017-02-15 VITALS
DIASTOLIC BLOOD PRESSURE: 76 MMHG | HEART RATE: 91 BPM | TEMPERATURE: 98 F | HEIGHT: 59 IN | SYSTOLIC BLOOD PRESSURE: 138 MMHG | BODY MASS INDEX: 24.88 KG/M2 | WEIGHT: 123.4 LBS

## 2017-02-15 DIAGNOSIS — J45.31 MILD PERSISTENT ASTHMA WITH ACUTE EXACERBATION: Primary | ICD-10-CM

## 2017-02-15 DIAGNOSIS — E04.1 THYROID NODULE: ICD-10-CM

## 2017-02-15 DIAGNOSIS — R74.01 ELEVATED AST (SGOT): ICD-10-CM

## 2017-02-15 DIAGNOSIS — R21 RASH OF HANDS: ICD-10-CM

## 2017-02-15 PROCEDURE — 99214 OFFICE O/P EST MOD 30 MIN: CPT | Performed by: INTERNAL MEDICINE

## 2017-02-15 RX ORDER — TRIAMCINOLONE ACETONIDE 1 MG/G
CREAM TOPICAL
Qty: 15 G | Refills: 0 | Status: SHIPPED | OUTPATIENT
Start: 2017-02-15

## 2017-02-15 RX ORDER — ALBUTEROL SULFATE 0.83 MG/ML
1 SOLUTION RESPIRATORY (INHALATION) EVERY 4 HOURS PRN
Qty: 1 BOX | Refills: 11 | Status: SHIPPED | OUTPATIENT
Start: 2017-02-15

## 2017-02-15 NOTE — NURSING NOTE
"Chief Complaint   Patient presents with     [facility] F/U     [facility]     Patient Request     would like a refill on her neb       Initial /76 (BP Location: Right arm, Patient Position: Chair, Cuff Size: Adult Regular)  Pulse 91  Temp 98  F (36.7  C) (Tympanic)  Ht 4' 11\" (1.499 m)  Wt 123 lb 6.4 oz (56 kg)  BMI 24.92 kg/m2 Estimated body mass index is 24.92 kg/(m^2) as calculated from the following:    Height as of this encounter: 4' 11\" (1.499 m).    Weight as of this encounter: 123 lb 6.4 oz (56 kg).  Medication Reconciliation: complete  "

## 2017-02-15 NOTE — MR AVS SNAPSHOT
After Visit Summary   2/15/2017    Karen Guevara    MRN: [id number]           Patient Information     Date Of Birth          1950        Visit Information        Provider Department      2/15/2017 10:00 AM Nik Jeong MD [facility]        Today's Diagnoses     Rash of hands    -  1    Asthma exacerbation        Elevated AST (SGOT)        Mild persistent asthma with acute exacerbation          Care Instructions    I would recommend that you get a biopsy of the thyroid nodule- please call us when you want to schedule this and we can place an order.    Schedule the lung function test once you are feeling better so we can see how your lungs are doing at baseline.      Recheck the liver test AST in one week to make sure it's back to normal.      Let us know if you decide you would like to see the allergy doctor.          Follow-ups after your visit        Your next 10 appointments already scheduled     Feb 23, 2017 11:00 AM CST   SHORT with Lavern Jefferson,    [facility]    [address] [id number] [phone]              Future tests that were ordered for you today     Open Future Orders        Priority Expected Expires Ordered    **AST FUTURE 2mo Routine 2/22/2017 6/15/2017 2/15/2017    General PFT Lab (Please always keep checked) Routine  2/15/2018 2/15/2017    Pulmonary Function Test Routine  2/15/2018 2/15/2017            Who to contact     If you have questions or need follow up information about today's clinic visit or your schedule please contact [facility] directly at [phone].  Normal or non-critical lab and imaging results will be communicated to you by MyChart, letter or phone within 4 business days after the clinic has received the results. If you do not hear from us within 7 days, please contact the clinic through MyChart or phone. If you have a critical or abnormal lab result, we  "will notify you by phone as soon as possible.  Submit refill requests through [employer] or call your pharmacy and they will forward the refill request to us. Please allow 3 business days for your refill to be completed.          Additional Information About Your Visit        [employer] Information     [employer] lets you send messages to your doctor, view your test results, renew your prescriptions, schedule appointments and more. To sign up, go to [email or web address] . Click on \"Log in\" on the left side of the screen, which will take you to the Welcome page. Then click on \"Sign up Now\" on the right side of the page.     You will be asked to enter the access code listed below, as well as some personal information. Please follow the directions to create your username and password.     Your access code is: [email or web address]  Expires: 2017 12:06 PM     Your access code will  in 90 days. If you need help or a new code, please call your [place] clinic or [phone].        Care EveryWhere ID     This is your Care EveryWhere ID. This could be used by other organizations to access your [place] medical records  [id number]        Your Vitals Were     Pulse Temperature Height BMI (Body Mass Index)          91 98  F (36.7  C) (Tympanic) 4' 11\" (1.499 m) 24.92 kg/m2         Blood Pressure from Last 3 Encounters:   02/15/17 138/76   17 115/68   17 122/66    Weight from Last 3 Encounters:   02/15/17 123 lb 6.4 oz (56 kg)   17 126 lb 8.7 oz (57.4 kg)   17 121 lb 3.2 oz (55 kg)                 Today's Medication Changes          These changes are accurate as of: 2/15/17 10:56 AM.  If you have any questions, ask your nurse or doctor.               Start taking these medicines.        Dose/Directions    triamcinolone 0.1 % cream   Commonly known as:  KENALOG   Used for:  Rash of hands   Started by:  Nik Jeong MD        Apply sparingly to affected area three times daily for 14 days.   Quantity:  15 " g   Refills:  0            Where to get your medicines      These medications were sent to [facility] [place], [place] - [address]  [address] [address]     Phone:  [phone]     albuterol (2.5 MG/3ML) 0.083% neb solution    triamcinolone 0.1 % cream                Primary Care Provider Office Phone # Fax #    Lavern Jefferson [phone]       [facility] [address] [address]        Thank you!     Thank you for choosing [facility]  for your care. Our goal is always to provide you with excellent care. Hearing back from our patients is one way we can continue to improve our services. Please take a few minutes to complete the written survey that you may receive in the mail after your visit with us. Thank you!             Your Updated Medication List - Protect others around you: Learn how to safely use, store and throw away your medicines at www.disposemymeds.org.          This list is accurate as of: 2/15/17 10:56 AM.  Always use your most recent med list.                   Brand Name Dispense Instructions for use    * albuterol 108 (90 BASE) MCG/ACT Inhaler    VENTOLIN HFA    1 Inhaler    Inhale 2 puffs into the lungs every 4 hours as needed for shortness of breath / dyspnea or wheezing       * albuterol (2.5 MG/3ML) 0.083% neb solution     1 Box    Take 1 vial (2.5 mg) by nebulization every 4 hours as needed for shortness of breath / dyspnea or wheezing       cholecalciferol 1000 UNIT tablet    vitamin D     Reported on 2/15/2017       CHOLEST OFF PO      take one tablet by mouth when eating a meal that is fatty       coenzyme Q-10 capsule      Take 1 capsule by mouth daily as needed Reported on 2/15/2017       DAILY HERBS IMMUNE DEFENSE PO      Take 2 capsules by mouth every evening Reported on 2/15/2017       fexofenadine 180 MG tablet    ALLEGRA    30 tablet    Take 1 tablet (180 mg) by mouth daily        fluticasone 50 MCG/ACT spray    FLONASE    1 Bottle    Spray 1 spray into both nostrils 2 times daily       OIL OF OREGANO PO      Reported on 2/15/2017       predniSONE 10 MG tablet    DELTASONE    38 tablet    Take 4 tabs by mouth daily x 5 days, then 3 tabs daily x 3 days, then 2 tabs daily x 3 days then 1 tab daily x 3 days then stop.       triamcinolone 0.1 % cream    KENALOG    15 g    Apply sparingly to affected area three times daily for 14 days.       VITAMIN A PO      Take 1 capsule by mouth daily Reported on 2/15/2017       vitamin B complex with vitamin C Tabs tablet      Take 1 tablet by mouth daily Reported on 2/15/2017       Zinc 25 MG Tabs      Take 0.5 tablets by mouth daily Reported on 2/15/2017       * Notice:  This list has 2 medication(s) that are the same as other medications prescribed for you. Read the directions carefully, and ask your doctor or other care provider to review them with you.

## 2017-02-15 NOTE — PATIENT INSTRUCTIONS
I would recommend that you get a biopsy of the thyroid nodule- please call us when you want to schedule this and we can place an order.    Schedule the lung function test once you are feeling better so we can see how your lungs are doing at baseline.      Recheck the liver test AST in one week to make sure it's back to normal.      Let us know if you decide you would like to see the allergy doctor.

## 2017-02-15 NOTE — PROGRESS NOTES
SUBJECTIVE:                                                    Karen Guevara is a 66 year old female who presents to clinic today for the following health issues:          Hospital Follow-up Visit:    Hospital/Nursing Home/IP Rehab Facility: [facility]  Date of Admission: 2/6/17  Date of Discharge: 2/8/17  Reason(s) for Admission: asthma and pneumonia            Problems taking medications regularly:  None       Medication changes since discharge: done with antibiotics        Problems adhering to non-medication therapy:  None    Summary of hospitalization:  [facility] discharge summary reviewed    Karen was hospitalized from February 6-8 with respiratory failure with hypoxia and asthma exacerbation due to community-acquired pneumonia.  She was treated with ceftriaxone and azithromycin during hospitalization and was discharged on azithromycin.  She was also started on a prednisone taper and was to continue her albuterol inhaler.  They discussed with her potentially starting a fluticasone inhaler daily to help control her asthma, but she was somewhat reluctant to do this considering that she fell her asthma was fairly well controlled at baseline. It was recommended that she follow up with PFTs and possibly an allergy/asthma consult.  They did note a murmur on admission but this had resolved at the time of discharge, so this was thought to likely be a benign flow murmur due to her acute illness.  She was also noted to have a slight elevation in her AST level and follow-up lab was recommended. She was also incidentally found to have a thyroid nodule on CT scan of the chest and this was followed up with a thyroid ultrasound that confirmed a 3.5 cm nodule in the right thyroid.  FNA was recommended, however, she stated that she wished to recover for her acute illness before pursuing this.      Diagnostic Tests/Treatments reviewed.  Follow up needed: see above  Other Healthcare Providers Involved in  Patient s Care:         None  Update since discharge: improved.     Since discharge, she feels that the tightness in her chest has opened up, her cough has reduced and is no longer productive. She still feels a bit short of breath with activity. She's been checking oxygen saturations at home and they have been in the 90s, which is an improvement. Her wheezing has also decreased.    Post Discharge Medication Reconciliation: discharge medications reconciled, continue medications without change.  Plan of care communicated with patient     Coding guidelines for this visit:  Type of Medical   Decision Making Face-to-Face Visit       within 7 Days of discharge Face-to-Face Visit        within 14 days of discharge   Moderate Complexity [id number]   High Complexity [id number]            She has chronic dry rash on her fingers and redness and pain at the base of her right 2nd fingernail- no discharge.  She says one of her physicians was going to prescribe a steroid cream but forgot, so she would like to try this now.      Problem list and histories reviewed & adjusted, as indicated.  Additional history: as documented    Current Outpatient Prescriptions   Medication Sig Dispense Refill     albuterol (2.5 MG/3ML) 0.083% neb solution Take 1 vial (2.5 mg) by nebulization every 4 hours as needed for shortness of breath / dyspnea or wheezing 1 Box 11     triamcinolone (KENALOG) 0.1 % cream Apply sparingly to affected area three times daily for 14 days. 15 g 0     albuterol (VENTOLIN HFA) 108 (90 BASE) MCG/ACT Inhaler Inhale 2 puffs into the lungs every 4 hours as needed for shortness of breath / dyspnea or wheezing 1 Inhaler 11     predniSONE (DELTASONE) 10 MG tablet Take 4 tabs by mouth daily x 5 days, then 3 tabs daily x 3 days, then 2 tabs daily x 3 days then 1 tab daily x 3 days then stop. 38 tablet 0     fexofenadine (ALLEGRA) 180 MG tablet Take 1 tablet (180 mg) by mouth daily 30 tablet 1     fluticasone (FLONASE) 50  "MCG/ACT nasal spray Spray 1 spray into both nostrils 2 times daily  1 Bottle 11     CHOLEST OFF OR take one tablet by mouth when eating a meal that is fatty       VITAMIN A PO Take 1 capsule by mouth daily Reported on 2/15/2017       [facility] Natural Products (DAILY HERBS IMMUNE DEFENSE PO) Take 2 capsules by mouth every evening Reported on 2/15/2017       vitamin B complex with vitamin C (VITAMIN  B COMPLEX) TABS tablet Take 1 tablet by mouth daily Reported on 2/15/2017       [DISCONTINUED] albuterol (2.5 MG/3ML) 0.083% neb solution Take 1 vial (2.5 mg) by nebulization every 4 hours as needed for shortness of breath / dyspnea or wheezing 1 Box 1     OIL OF OREGANO PO Reported on 2/15/2017       Zinc 25 MG TABS Take 0.5 tablets by mouth daily Reported on 2/15/2017       Coenzyme Q-10 capsule Take 1 capsule by mouth daily as needed Reported on 2/15/2017       VITAMIN D 1000 UNIT OR TABS Reported on 2/15/2017       Allergies   Allergen Reactions     Amoxicillin Nausea and Vomiting     Claritin [Loratadine] Hives       ROS:  Constitutional, HEENT, cardiovascular, pulmonary, gi and gu systems are negative, except as otherwise noted.    OBJECTIVE:                                                    /76 (BP Location: Right arm, Patient Position: Chair, Cuff Size: Adult Regular)  Pulse 91  Temp 98  F (36.7  C) (Tympanic)  Ht 4' 11\" (1.499 m)  Wt 123 lb 6.4 oz (56 kg)  BMI 24.92 kg/m2  Body mass index is 24.92 kg/(m^2).  GENERAL: healthy, alert and no distress  NECK: thyroid asymmetric (left surgically absent) but discrete nodule not appreciated  RESP: mild wheezes in upper anterior lung fields  CV: regular rate and rhythm, normal S1 S2, no S3 or S4, no murmur, click or rub  SKIN: scaly rash on finger with redness and swelling at base of R 2nd fingernail    Diagnostic Test Results:  none      ASSESSMENT/PLAN:                                                        1. Mild persistent asthma with acute exacerbation and " community acquired pneumonia    She is improving.  Has completed antibiotic.  Will have her continue prednisone taper as planned.  Recommended getting updated PFTs when she is feeling better.  Discussed potentially starting fluticasone inhaler daily for better control of symptoms, but she doesn't feel this is needed.  If PFTs are abnormal, would again recommend this.  She had a lot of allergies, and we discussed seeing the allergy specialists- she may consider this in the future but does not want to schedule now.      - General PFT Lab (Please always keep checked); Future  - Pulmonary Function Test; Future    2. Elevated AST (SGOT)    AST elevated in hospital, possibly due to illness.  Recommend rechecking in 1 week.    - **AST FUTURE ; Future    3. Thyroid nodule    FNA recommended for 3.5cm right thyroid nodule.  She is not too concerned about this and feels it is likely benign.  She wishes to recover from her current illness before pursuing FNA.      4. Rash of hands    She has scaly rash on her fingers.  She states one previous doctor discussed trying a steroid cream for this, but forgot to give her a prescription, so she requests to try this now.  Will try some triamcinolone.  She will also try this on the pigmented rash on her torso to see if this results in any improvement.    Base of R 2nd fingernail is red and swollen- advised her to watch for discharge that would indicate infection.      - triamcinolone (KENALOG) 0.1 % cream; Apply sparingly to affected area three times daily for 14 days.  Dispense: 15 g; Refill: 0    She has an appointment to follow-up with her PCP next week.      Nik Jeong MD  [facility]

## 2017-02-23 ENCOUNTER — HOSPITAL ENCOUNTER (OUTPATIENT)
Facility: CLINIC | Age: 67
Setting detail: OBSERVATION
Discharge: HOME | End: 2017-02-24
Attending: INTERNAL MEDICINE | Admitting: INTERNAL MEDICINE
Payer: COMMERCIAL

## 2017-02-23 ENCOUNTER — CARE COORDINATION (OUTPATIENT)
Dept: CARE COORDINATION | Facility: CLINIC | Age: 67
End: 2017-02-23

## 2017-02-23 ENCOUNTER — APPOINTMENT (OUTPATIENT)
Dept: CT IMAGING | Facility: CLINIC | Age: 67
End: 2017-02-23
Attending: EMERGENCY MEDICINE
Payer: COMMERCIAL

## 2017-02-23 ENCOUNTER — OFFICE VISIT (OUTPATIENT)
Dept: FAMILY MEDICINE | Facility: CLINIC | Age: 67
End: 2017-02-23
Payer: COMMERCIAL

## 2017-02-23 ENCOUNTER — HOSPITAL ENCOUNTER (OUTPATIENT)
Dept: RESPIRATORY THERAPY | Facility: CLINIC | Age: 67
Discharge: HOME | End: 2017-02-23
Attending: INTERNAL MEDICINE | Admitting: INTERNAL MEDICINE
Payer: COMMERCIAL

## 2017-02-23 ENCOUNTER — HOSPITAL ENCOUNTER (EMERGENCY)
Facility: CLINIC | Age: 67
Discharge: ANOTHER HOSPITAL | End: 2017-02-23
Attending: EMERGENCY MEDICINE | Admitting: EMERGENCY MEDICINE
Payer: COMMERCIAL

## 2017-02-23 VITALS
RESPIRATION RATE: 20 BRPM | HEIGHT: 59 IN | BODY MASS INDEX: 24.8 KG/M2 | WEIGHT: 123 LBS | DIASTOLIC BLOOD PRESSURE: 66 MMHG | TEMPERATURE: 99 F | SYSTOLIC BLOOD PRESSURE: 140 MMHG | OXYGEN SATURATION: 97 %

## 2017-02-23 VITALS
SYSTOLIC BLOOD PRESSURE: 127 MMHG | BODY MASS INDEX: 25.4 KG/M2 | TEMPERATURE: 98 F | OXYGEN SATURATION: 84 % | HEIGHT: 59 IN | WEIGHT: 126 LBS | HEART RATE: 81 BPM | DIASTOLIC BLOOD PRESSURE: 67 MMHG

## 2017-02-23 DIAGNOSIS — I21.4 NON-STEMI (NON-ST ELEVATED MYOCARDIAL INFARCTION) (H): ICD-10-CM

## 2017-02-23 DIAGNOSIS — J45.31 MILD PERSISTENT ASTHMA WITH ACUTE EXACERBATION: ICD-10-CM

## 2017-02-23 DIAGNOSIS — J96.01 ACUTE RESPIRATORY FAILURE WITH HYPOXIA (H): Primary | ICD-10-CM

## 2017-02-23 DIAGNOSIS — R07.89 CHEST PAIN, ATYPICAL: Primary | ICD-10-CM

## 2017-02-23 DIAGNOSIS — R06.09 DYSPNEA ON EXERTION: ICD-10-CM

## 2017-02-23 PROBLEM — I24.9 ACS (ACUTE CORONARY SYNDROME) (H): Status: ACTIVE | Noted: 2017-02-23

## 2017-02-23 LAB
ALBUMIN SERPL-MCNC: 3 G/DL (ref 3.4–5)
ALP SERPL-CCNC: 65 U/L (ref 40–150)
ALT SERPL W P-5'-P-CCNC: 42 U/L (ref 0–50)
ANION GAP SERPL CALCULATED.3IONS-SCNC: 9 MMOL/L (ref 3–14)
AST SERPL W P-5'-P-CCNC: 35 U/L (ref 0–45)
BASE EXCESS BLDV CALC-SCNC: 5.3 MMOL/L
BASOPHILS # BLD AUTO: 0.1 10E9/L (ref 0–0.2)
BASOPHILS NFR BLD AUTO: 0.6 %
BILIRUB SERPL-MCNC: 0.8 MG/DL (ref 0.2–1.3)
BUN SERPL-MCNC: 12 MG/DL (ref 7–30)
CALCIUM SERPL-MCNC: 8.6 MG/DL (ref 8.5–10.1)
CHLORIDE SERPL-SCNC: 106 MMOL/L (ref 94–109)
CO2 SERPL-SCNC: 29 MMOL/L (ref 20–32)
CREAT SERPL-MCNC: 0.72 MG/DL (ref 0.52–1.04)
DIFFERENTIAL METHOD BLD: ABNORMAL
EOSINOPHIL # BLD AUTO: 0.3 10E9/L (ref 0–0.7)
EOSINOPHIL NFR BLD AUTO: 1.8 %
ERYTHROCYTE [DISTWIDTH] IN BLOOD BY AUTOMATED COUNT: 13.6 % (ref 10–15)
GFR SERPL CREATININE-BSD FRML MDRD: 81 ML/MIN/1.7M2
GLUCOSE SERPL-MCNC: 97 MG/DL (ref 70–99)
HCO3 BLDV-SCNC: 31 MMOL/L (ref 21–28)
HCT VFR BLD AUTO: 41.7 % (ref 35–47)
HGB BLD-MCNC: 14.1 G/DL (ref 11.7–15.7)
IMM GRANULOCYTES # BLD: 0 10E9/L (ref 0–0.4)
IMM GRANULOCYTES NFR BLD: 0.1 %
LYMPHOCYTES # BLD AUTO: 1.5 10E9/L (ref 0.8–5.3)
LYMPHOCYTES NFR BLD AUTO: 11.1 %
MCH RBC QN AUTO: 33.7 PG (ref 26.5–33)
MCHC RBC AUTO-ENTMCNC: 33.8 G/DL (ref 31.5–36.5)
MCV RBC AUTO: 100 FL (ref 78–100)
MONOCYTES # BLD AUTO: 1.1 10E9/L (ref 0–1.3)
MONOCYTES NFR BLD AUTO: 8.1 %
NEUTROPHILS # BLD AUTO: 10.7 10E9/L (ref 1.6–8.3)
NEUTROPHILS NFR BLD AUTO: 78.3 %
NT-PROBNP SERPL-MCNC: 297 PG/ML (ref 0–900)
PCO2 BLDV: 51 MM HG (ref 40–50)
PH BLDV: 7.39 PH (ref 7.32–7.43)
PLATELET # BLD AUTO: 201 10E9/L (ref 150–450)
PO2 BLDV: 31 MM HG (ref 25–47)
POTASSIUM SERPL-SCNC: 3.6 MMOL/L (ref 3.4–5.3)
PROT SERPL-MCNC: 6.7 G/DL (ref 6.8–8.8)
RBC # BLD AUTO: 4.19 10E12/L (ref 3.8–5.2)
SODIUM SERPL-SCNC: 144 MMOL/L (ref 133–144)
TROPONIN I SERPL-MCNC: 0.26 UG/L (ref 0–0.04)
TROPONIN I SERPL-MCNC: 0.26 UG/L (ref 0–0.04)
TROPONIN I SERPL-MCNC: 0.28 UG/L (ref 0–0.04)
WBC # BLD AUTO: 13.7 10E9/L (ref 4–11)

## 2017-02-23 PROCEDURE — 25000128 H RX IP 250 OP 636: Performed by: EMERGENCY MEDICINE

## 2017-02-23 PROCEDURE — 96374 THER/PROPH/DIAG INJ IV PUSH: CPT | Mod: 59

## 2017-02-23 PROCEDURE — 93005 ELECTROCARDIOGRAM TRACING: CPT

## 2017-02-23 PROCEDURE — 25000132 ZZH RX MED GY IP 250 OP 250 PS 637: Performed by: EMERGENCY MEDICINE

## 2017-02-23 PROCEDURE — 99285 EMERGENCY DEPT VISIT HI MDM: CPT | Mod: 25 | Performed by: EMERGENCY MEDICINE

## 2017-02-23 PROCEDURE — 84484 ASSAY OF TROPONIN QUANT: CPT | Performed by: EMERGENCY MEDICINE

## 2017-02-23 PROCEDURE — 93010 ELECTROCARDIOGRAM REPORT: CPT | Performed by: EMERGENCY MEDICINE

## 2017-02-23 PROCEDURE — 96361 HYDRATE IV INFUSION ADD-ON: CPT

## 2017-02-23 PROCEDURE — 84484 ASSAY OF TROPONIN QUANT: CPT | Performed by: PHYSICIAN ASSISTANT

## 2017-02-23 PROCEDURE — 25500064 ZZH RX 255 OP 636: Performed by: RADIOLOGY

## 2017-02-23 PROCEDURE — 99285 EMERGENCY DEPT VISIT HI MDM: CPT | Mod: 25

## 2017-02-23 PROCEDURE — 25000125 ZZHC RX 250: Performed by: RADIOLOGY

## 2017-02-23 PROCEDURE — 25000132 ZZH RX MED GY IP 250 OP 250 PS 637: Performed by: PHYSICIAN ASSISTANT

## 2017-02-23 PROCEDURE — 99207 ZZC OFFICE-HOSPITAL ADMIT: CPT | Performed by: INTERNAL MEDICINE

## 2017-02-23 PROCEDURE — 80053 COMPREHEN METABOLIC PANEL: CPT | Performed by: EMERGENCY MEDICINE

## 2017-02-23 PROCEDURE — 21400005 ZZH R&B CCU CICU INTERMEDIATE

## 2017-02-23 PROCEDURE — 82803 BLOOD GASES ANY COMBINATION: CPT | Performed by: EMERGENCY MEDICINE

## 2017-02-23 PROCEDURE — 99223 1ST HOSP IP/OBS HIGH 75: CPT | Mod: AI | Performed by: INTERNAL MEDICINE

## 2017-02-23 PROCEDURE — 71260 CT THORAX DX C+: CPT

## 2017-02-23 PROCEDURE — 83880 ASSAY OF NATRIURETIC PEPTIDE: CPT | Performed by: EMERGENCY MEDICINE

## 2017-02-23 PROCEDURE — 85025 COMPLETE CBC W/AUTO DIFF WBC: CPT | Performed by: EMERGENCY MEDICINE

## 2017-02-23 PROCEDURE — 25000128 H RX IP 250 OP 636: Performed by: PHYSICIAN ASSISTANT

## 2017-02-23 PROCEDURE — 36415 COLL VENOUS BLD VENIPUNCTURE: CPT | Performed by: PHYSICIAN ASSISTANT

## 2017-02-23 RX ORDER — PROCHLORPERAZINE 25 MG
12.5 SUPPOSITORY, RECTAL RECTAL EVERY 12 HOURS PRN
Status: DISCONTINUED | OUTPATIENT
Start: 2017-02-23 | End: 2017-02-24 | Stop reason: HOSPADM

## 2017-02-23 RX ORDER — FLUTICASONE PROPIONATE 50 MCG
1 SPRAY, SUSPENSION (ML) NASAL 2 TIMES DAILY
Status: DISCONTINUED | OUTPATIENT
Start: 2017-02-24 | End: 2017-02-24 | Stop reason: HOSPADM

## 2017-02-23 RX ORDER — NALOXONE HYDROCHLORIDE 0.4 MG/ML
.1-.4 INJECTION, SOLUTION INTRAMUSCULAR; INTRAVENOUS; SUBCUTANEOUS
Status: DISCONTINUED | OUTPATIENT
Start: 2017-02-23 | End: 2017-02-24 | Stop reason: HOSPADM

## 2017-02-23 RX ORDER — HYDRALAZINE HYDROCHLORIDE 20 MG/ML
10 INJECTION INTRAMUSCULAR; INTRAVENOUS EVERY 4 HOURS PRN
Status: DISCONTINUED | OUTPATIENT
Start: 2017-02-23 | End: 2017-02-24 | Stop reason: HOSPADM

## 2017-02-23 RX ORDER — IOPAMIDOL 755 MG/ML
64 INJECTION, SOLUTION INTRAVASCULAR ONCE
Status: COMPLETED | OUTPATIENT
Start: 2017-02-23 | End: 2017-02-23

## 2017-02-23 RX ORDER — AMOXICILLIN 250 MG
1-2 CAPSULE ORAL 2 TIMES DAILY PRN
Status: DISCONTINUED | OUTPATIENT
Start: 2017-02-23 | End: 2017-02-24 | Stop reason: HOSPADM

## 2017-02-23 RX ORDER — ONDANSETRON 4 MG/1
4 TABLET, ORALLY DISINTEGRATING ORAL EVERY 6 HOURS PRN
Status: DISCONTINUED | OUTPATIENT
Start: 2017-02-23 | End: 2017-02-24 | Stop reason: HOSPADM

## 2017-02-23 RX ORDER — LIDOCAINE 40 MG/G
CREAM TOPICAL
Status: DISCONTINUED | OUTPATIENT
Start: 2017-02-23 | End: 2017-02-24 | Stop reason: HOSPADM

## 2017-02-23 RX ORDER — PROCHLORPERAZINE MALEATE 5 MG
5 TABLET ORAL EVERY 6 HOURS PRN
Status: DISCONTINUED | OUTPATIENT
Start: 2017-02-23 | End: 2017-02-24 | Stop reason: HOSPADM

## 2017-02-23 RX ORDER — ALBUTEROL SULFATE 0.83 MG/ML
1 SOLUTION RESPIRATORY (INHALATION) EVERY 4 HOURS PRN
Status: DISCONTINUED | OUTPATIENT
Start: 2017-02-23 | End: 2017-02-24 | Stop reason: HOSPADM

## 2017-02-23 RX ORDER — ACETAMINOPHEN 325 MG/1
650 TABLET ORAL EVERY 4 HOURS PRN
Status: DISCONTINUED | OUTPATIENT
Start: 2017-02-23 | End: 2017-02-24 | Stop reason: HOSPADM

## 2017-02-23 RX ORDER — ONDANSETRON 2 MG/ML
4 INJECTION INTRAMUSCULAR; INTRAVENOUS EVERY 6 HOURS PRN
Status: DISCONTINUED | OUTPATIENT
Start: 2017-02-23 | End: 2017-02-24 | Stop reason: HOSPADM

## 2017-02-23 RX ORDER — LISINOPRIL 5 MG/1
5 TABLET ORAL DAILY
Status: DISCONTINUED | OUTPATIENT
Start: 2017-02-24 | End: 2017-02-24 | Stop reason: HOSPADM

## 2017-02-23 RX ORDER — ALUMINA, MAGNESIA, AND SIMETHICONE 2400; 2400; 240 MG/30ML; MG/30ML; MG/30ML
15-30 SUSPENSION ORAL EVERY 4 HOURS PRN
Status: DISCONTINUED | OUTPATIENT
Start: 2017-02-23 | End: 2017-02-24 | Stop reason: HOSPADM

## 2017-02-23 RX ORDER — NITROGLYCERIN 0.4 MG/1
0.4 TABLET SUBLINGUAL EVERY 5 MIN PRN
Status: DISCONTINUED | OUTPATIENT
Start: 2017-02-23 | End: 2017-02-24 | Stop reason: HOSPADM

## 2017-02-23 RX ORDER — SODIUM CHLORIDE 9 MG/ML
1000 INJECTION, SOLUTION INTRAVENOUS CONTINUOUS
Status: DISCONTINUED | OUTPATIENT
Start: 2017-02-23 | End: 2017-02-23 | Stop reason: HOSPADM

## 2017-02-23 RX ORDER — METOPROLOL TARTRATE 25 MG/1
25 TABLET, FILM COATED ORAL 2 TIMES DAILY
Status: DISCONTINUED | OUTPATIENT
Start: 2017-02-23 | End: 2017-02-24

## 2017-02-23 RX ORDER — TURMERIC 100 %
POWDER (GRAM) MISCELLANEOUS
COMMUNITY

## 2017-02-23 RX ORDER — SIMVASTATIN 20 MG
20 TABLET ORAL EVERY EVENING
Status: DISCONTINUED | OUTPATIENT
Start: 2017-02-23 | End: 2017-02-24 | Stop reason: HOSPADM

## 2017-02-23 RX ORDER — ASPIRIN 81 MG/1
81 TABLET, CHEWABLE ORAL DAILY
Status: DISCONTINUED | OUTPATIENT
Start: 2017-02-24 | End: 2017-02-24 | Stop reason: HOSPADM

## 2017-02-23 RX ORDER — ASPIRIN 81 MG/1
324 TABLET, CHEWABLE ORAL ONCE
Status: COMPLETED | OUTPATIENT
Start: 2017-02-23 | End: 2017-02-23

## 2017-02-23 RX ORDER — FEXOFENADINE HCL 180 MG/1
180 TABLET ORAL DAILY
Status: DISCONTINUED | OUTPATIENT
Start: 2017-02-24 | End: 2017-02-24 | Stop reason: HOSPADM

## 2017-02-23 RX ORDER — ALBUTEROL SULFATE 90 UG/1
2 AEROSOL, METERED RESPIRATORY (INHALATION) EVERY 4 HOURS PRN
Status: DISCONTINUED | OUTPATIENT
Start: 2017-02-23 | End: 2017-02-24 | Stop reason: HOSPADM

## 2017-02-23 RX ORDER — SODIUM CHLORIDE 9 MG/ML
INJECTION, SOLUTION INTRAVENOUS CONTINUOUS
Status: DISCONTINUED | OUTPATIENT
Start: 2017-02-23 | End: 2017-02-24 | Stop reason: HOSPADM

## 2017-02-23 RX ADMIN — METOPROLOL TARTRATE 25 MG: 25 TABLET, FILM COATED ORAL at 22:04

## 2017-02-23 RX ADMIN — IOPAMIDOL 64 ML: 755 INJECTION, SOLUTION INTRAVENOUS at 14:15

## 2017-02-23 RX ADMIN — SODIUM CHLORIDE 1000 ML: 9 INJECTION, SOLUTION INTRAVENOUS at 14:31

## 2017-02-23 RX ADMIN — ASPIRIN 81 MG 324 MG: 81 TABLET ORAL at 17:58

## 2017-02-23 RX ADMIN — SODIUM CHLORIDE 93 ML: 9 INJECTION, SOLUTION INTRAVENOUS at 14:15

## 2017-02-23 RX ADMIN — SODIUM CHLORIDE 500 ML: 9 INJECTION, SOLUTION INTRAVENOUS at 13:54

## 2017-02-23 RX ADMIN — Medication 3000 UNITS: at 17:31

## 2017-02-23 RX ADMIN — HEPARIN SODIUM 600 UNITS/HR: 10000 INJECTION, SOLUTION INTRAVENOUS at 17:32

## 2017-02-23 RX ADMIN — SIMVASTATIN 20 MG: 20 TABLET, FILM COATED ORAL at 22:04

## 2017-02-23 RX ADMIN — SODIUM CHLORIDE: 9 INJECTION, SOLUTION INTRAVENOUS at 22:05

## 2017-02-23 ASSESSMENT — ENCOUNTER SYMPTOMS
NEUROLOGICAL NEGATIVE: 1
CARDIOVASCULAR NEGATIVE: 1
COUGH: 1
ENDOCRINE NEGATIVE: 1
SHORTNESS OF BREATH: 1
MUSCULOSKELETAL NEGATIVE: 1
ALLERGIC/IMMUNOLOGIC NEGATIVE: 1
GASTROINTESTINAL NEGATIVE: 1
HEMATOLOGIC/LYMPHATIC NEGATIVE: 1
CONSTITUTIONAL NEGATIVE: 1
PSYCHIATRIC NEGATIVE: 1
EYES NEGATIVE: 1

## 2017-02-23 NOTE — PROGRESS NOTES
SUBJECTIVE:                                                    Karen Guevara is a 66 year old female who presents to clinic today for the following health issues:      Asthma Follow-Up    Was ACT completed today?  No      Respiratory symptoms:   Cough: Yes-  Better and is clear now   Wheezing: Yes-  A little at night time    Shortness of breath: Yes-  Still very tired    Use of short- acting(rescue) inhaler: yes does 3-4 times per day    Taking controlled (daily) meds as prescribed: Yes    ER/UC visits or hospital admissions since last visit: Hospital 2/06-2/08/17    Recent asthma triggers that patient is dealing with: upper respiratory infections       Amount of exercise or physical activity: None    Problems taking medications regularly: No    Medication side effects: none  Diet: regular (no restrictions)    Chief Complaint   Patient presents with     Asthma     follow up on pneumonia. Still feeling winded and tired alot. Cough is productive but clear now.      Pulmonary Function Testing     go over results   she was admitted 2/6-2/8 for acute hypoxic respiratory failure due to asthma flare and pneumonia.  Discharge on zpak, albuterol inhaler and prednisone taper.  She saw another provider for hospital f/u on 2/15 who recommend ICS, allergy referral but she declined.    She says she feels fine and is getting better.  However, on further questioning, she says otherwise.  She thinks she is coughing less, and feels she is wheezing less.  She also feels less chest tightness. She continues to feel short of breath with minor activity such as dressing.  Energy level remains poor.  Denies any chest pain with walking     She has an florian on her phone where she can check her O2 sat.  She has been seeing it in 90s at rest.  She has noted it to drop into 80s with exertion.  She will do 'breathing exercises' and it will come up to 90s.  Pedro was  77%.  Her breathing exercises is resting and taking deep breaths.    No leg  swelling, no longer having fevers like when she was hospitalized.     She wants a handicapp sticker bc she can't walk very far w/out dyspnea.    She has been coughing many times/day since last spring.     PFT done this AM shows FVC 88%, FEV1 90%, FEV1/, but DLCO 36%    She is using the inhaler or neb 2 puffs Q 4 hours  It helps some.  She finished prednisone yesterday.      Current Outpatient Prescriptions   Medication Sig Dispense Refill     KRILL OIL PO        Turmeric POWD        albuterol (VENTOLIN HFA) 108 (90 BASE) MCG/ACT Inhaler Inhale 2 puffs into the lungs every 4 hours as needed for shortness of breath / dyspnea or wheezing 1 Inhaler 11     Misc Natural Products (DAILY HERBS IMMUNE DEFENSE PO) Take 2 capsules by mouth every evening Reported on 2/15/2017       fexofenadine (ALLEGRA) 180 MG tablet Take 1 tablet (180 mg) by mouth daily 30 tablet 1     fluticasone (FLONASE) 50 MCG/ACT nasal spray Spray 1 spray into both nostrils 2 times daily  1 Bottle 11     VITAMIN D 1000 UNIT OR TABS Reported on 2/15/2017       albuterol (2.5 MG/3ML) 0.083% neb solution Take 1 vial (2.5 mg) by nebulization every 4 hours as needed for shortness of breath / dyspnea or wheezing (Patient not taking: Reported on 2/23/2017) 1 Box 11     triamcinolone (KENALOG) 0.1 % cream Apply sparingly to affected area three times daily for 14 days. 15 g 0     predniSONE (DELTASONE) 10 MG tablet Take 4 tabs by mouth daily x 5 days, then 3 tabs daily x 3 days, then 2 tabs daily x 3 days then 1 tab daily x 3 days then stop. (Patient not taking: Reported on 2/23/2017) 38 tablet 0     VITAMIN A PO Take 1 capsule by mouth daily Reported on 2/23/2017       vitamin B complex with vitamin C (VITAMIN  B COMPLEX) TABS tablet Take 1 tablet by mouth daily Reported on 2/15/2017       OIL OF OREGANO PO Reported on 2/23/2017       Zinc 25 MG TABS Take 0.5 tablets by mouth daily Reported on 2/15/2017       Coenzyme Q-10 capsule Take 1 capsule by mouth  "daily as needed Reported on 2/15/2017       CHOLEST OFF OR take one tablet by mouth when eating a meal that is fatty       Problem list, Medication list, Allergies, and Medical/Social/Surgical histories reviewed in [facility] and updated as appropriate.    ROS:  Constitutional, HEENT, cardiovascular, pulmonary, gi and gu systems are negative, except as otherwise noted.    OBJECTIVE:                                                    /67 (BP Location: Right arm, Patient Position: Chair, Cuff Size: Adult Small)  Pulse 81  Temp 98  F (36.7  C) (Tympanic)  Ht 4' 11\" (1.499 m)  Wt 126 lb (57.2 kg)  SpO2 (!) 84%  BMI 25.45 kg/m2  Body mass index is 25.45 kg/(m^2).     Walked with patient checking oxygen statis. On 2 with the 02 machine and walked 20 feet and dropped to 89%. Sitting again with 2% of oxygen on went to 100 if didn't speak, if spoke dropped to 89 and then as low as 75 when speaking and having 02 on. Turned off 02 when patient got back to 100% then stood and walkied less than 15 ft 02 down to 89 and at 20ft was down to 75. Put her back on 2 with her oxygen and took 2 minutes to get 02 back to 94%.      GENERAL APPEARANCE: alert, no distress, fatigued and short of breath at rest.  RESP: faint crackles at bilateral bases  CV: 1/6 systolic murmur  LYMPHATICS: trace pedal edema    CT-scan of the chest  1. Bilateral lung parenchymal abnormalities are present, especially  in the lower lung zones. Inflammatory/infectious etiologies are  favored.  2. Mild mediastinal and hilar adenopathy are present. They are  nonspecific. They could be reactive, given the lung findings.  3. No CT evidence of pulmonary embolus.        ASSESSMENT/PLAN:                                                      1. Acute respiratory failure with hypoxia (H) - infection vs ILD.  More worried about ILD, given lack of improvement with antibiotic, and bilateral interstital appearnce of infilatrats on CT.  Patient strongly declined ER/hospital " admission at first.  We are unable to get patient oxygen to go home with, and I don't feel safe w/her leaving the clinic as hypoxic as she is.  Send to ER with plans for admission and either inpatient or phone pulm consultation.  - PULMONARY MEDICINE REFERRAL  - CARE COORDINATION REFERRAL  - order for DME; Equipment being ordered: Oxygen  Dispense: 1 each; Refill: 11      Lavern Jefferson DO  [facility]

## 2017-02-23 NOTE — ED PROVIDER NOTES
"  History     Chief Complaint   Patient presents with     Shortness of Breath     seen @ clinic for follow up lung function test, noted low O2 sat. \"exhausted.\"      HPI  Karen Guevara is a 66 year old female with a history of asthma who was sent to the ED from clinic for shortness of breath and low oxygen sats. Patient was hospitalized 2/6-2/8 at this facility for acute respiratory failure with hypoxia due to pneumonia and an asthma exacerbation. She was treated with ceftriaxone and azithromycin inpatient and discharged with azithromycin 250 mg x 6 days and prednisone taper. At that time a murmur was also heard 2/6 but not since then. Patient followed up with Dr. Jefferson in clinic earlier today at which time her oxygen sats were 79% on room air and 89% on 2 L oxygen. Sats dropped with speaking and walking, despite being on 2L of oxygen, dropping as low as 75%. Dr. Jefferson was concerned for ILD so she sent the patient to the ED for further care and admission. Currently the patient reports she feels better than prior to her admission but she still has exertional shortness of breath with minimal activity, even when just getting dressed. Additionally, last night the patient hardly slept because she brought her  to the ED at 2 am for pneumonia and she is very tired today. She is a non-smoker. No swelling in legs. She adds that she has Raynaud's so her hands are always cold. No personal history of heart problems. She does have family history of heart disease.     Social History: Lives in [place], [place]. Presents to ED from clinic via private car.      Past Medical History:  Past Medical History   Diagnosis Date     Allergic rhinitis due to other allergen      Mild intermittent asthma      Other and unspecified hyperlipidemia      Unspecified hypothyroidism      Vitiligo        Medications:  Current Outpatient Prescriptions   Medication Sig Dispense Refill     KRILL OIL PO        Turmeric POWD        albuterol (2.5 " MG/3ML) 0.083% neb solution Take 1 vial (2.5 mg) by nebulization every 4 hours as needed for shortness of breath / dyspnea or wheezing 1 Box 11     triamcinolone (KENALOG) 0.1 % cream Apply sparingly to affected area three times daily for 14 days. 15 g 0     albuterol (VENTOLIN HFA) 108 (90 BASE) MCG/ACT Inhaler Inhale 2 puffs into the lungs every 4 hours as needed for shortness of breath / dyspnea or wheezing 1 Inhaler 11     VITAMIN A PO Take 1 capsule by mouth daily Reported on 2/23/2017       [facility] (DAILY HERBS IMMUNE DEFENSE PO) Take 2 capsules by mouth every evening Reported on 2/15/2017       vitamin B complex with vitamin C (VITAMIN  B COMPLEX) TABS tablet Take 1 tablet by mouth daily Reported on 2/15/2017       fexofenadine (ALLEGRA) 180 MG tablet Take 1 tablet (180 mg) by mouth daily 30 tablet 1     fluticasone (FLONASE) 50 MCG/ACT nasal spray Spray 1 spray into both nostrils 2 times daily  1 Bottle 11     VITAMIN D 1000 UNIT OR TABS Reported on 2/15/2017       order for DME Equipment being ordered: Oxygen 1 each 11     CHOLEST OFF OR take one tablet by mouth when eating a meal that is fatty         Allergies:  Allergies   Allergen Reactions     Amoxicillin Nausea and Vomiting     Claritin [Loratadine] Hives       I have reviewed the Medications, Allergies, Past Medical and Surgical History, and Social History in the Epic system.    Review of Systems   Constitutional: Negative.    HENT: Negative.    Eyes: Negative.    Respiratory: Positive for cough and shortness of breath.    Cardiovascular: Negative.    Gastrointestinal: Negative.    Endocrine: Negative.    Genitourinary: Negative.    Musculoskeletal: Negative.    Skin: Negative.    Allergic/Immunologic: Negative.    Neurological: Negative.    Hematological: Negative.    Psychiatric/Behavioral: Negative.        Physical Exam      Physical Exam   Constitutional: She is oriented to person, place, and time. She appears well-developed and  well-nourished. No distress.   HENT:   Head: Normocephalic and atraumatic.   Eyes: Conjunctivae and EOM are normal. Pupils are equal, round, and reactive to light. Right eye exhibits no discharge. Left eye exhibits no discharge. No scleral icterus.   Neck: Normal range of motion. Neck supple. No JVD present. No tracheal deviation present. No thyromegaly present.   Cardiovascular: Normal rate and regular rhythm.  Exam reveals no gallop and no friction rub.    No murmur heard.  Pulmonary/Chest: Effort normal and breath sounds normal. No stridor. No respiratory distress. She has no wheezes. She has no rales. She exhibits no tenderness.   Abdominal: Soft. Bowel sounds are normal. She exhibits no distension and no mass. There is no tenderness. There is no rebound and no guarding.   Lymphadenopathy:     She has no cervical adenopathy.   Neurological: She is alert and oriented to person, place, and time. She displays normal reflexes. No cranial nerve deficit. She exhibits normal muscle tone. Coordination normal.   Skin: No rash noted. She is not diaphoretic. No erythema. No pallor.   Psychiatric: She has a normal mood and affect. Her behavior is normal. Judgment and thought content normal.       ED Course     ED Course     Procedures             EKG Interpretation:      Interpreted by Ad Hurd  Time reviewed:13:38  Symptoms at time of EKG: None   Rhythm: normal sinus   Rate: Normal  Axis: Normal  Ectopy: none  Conduction: normal  ST Segments/ T Waves: Non-specific ST-T wave changes  Q Waves: nonspecific  Comparison to prior: No old EKG available    Clinical Impression: non-specific EKG        Critical Care time:  none                   ED Medications:  Medications   0.9% sodium chloride infusion (1,000 mLs Intravenous New Bag 2/23/17 1431)   aspirin chewable tablet 324 mg (not administered)   heparin Loading Dose bolus dose from infusion pump 3,000 Units (not administered)   heparin infusion 25,000 units in  "0.45% NaCl 250 mL (not administered)   0.9% sodium chloride BOLUS (0 mLs Intravenous Stopped 2/23/17 1430)   iopamidol (ISOVUE-370) solution 64 mL (64 mLs Intravenous Given 2/23/17 1415)   sodium chloride 0.9 % for CT scan flush dose 93 mL (93 mLs Intravenous Given 2/23/17 1415)       ED Vitals:  Vitals:    02/23/17 1615 02/23/17 1630 02/23/17 1645 02/23/17 1700   BP: 156/87 155/82 158/84 155/89   Resp: 20      Temp:       TempSrc:       SpO2: 97% 97% 96% 95%   Weight:       Height:    1.499 m (4' 11\")       ED Labs and Imaging:  Results for orders placed or performed during the hospital encounter of 02/23/17   Chest CT - IV contrast only - PE protocol    Narrative    CT CHEST PULMONARY EMBOLISM WITH CONTRAST 2/23/2017 2:22 PM     HISTORY: Hypoxia, recent hospitalization for pneumonia. Compare with  CT chest from 2/6/2017. Exclude interstitial process versus other.    COMPARISON: CT chest 2/6/2017.    TECHNIQUE: Thin section axial images are performed from the thoracic  inlet to the lung bases utilizing 64 mL of Isovue 370 IV contrast  without adverse event. Coronal reformatted images are also generated.  Radiation dose for this scan was reduced using automated exposure  control, adjustment of the mA and/or kV according to patient size, or  iterative reconstruction technique.    FINDINGS:   Chest: Indeterminate 0.4 cm nodule left lower lobe is noted on series  5, image 83. This is stable since prior exam. The extensive bilateral  lower lobe infiltrate appears improved but persistent subpleural  groundglass opacities and centrilobular nodules suggests some residual  inflammation and/or alveolitis. Airways are patent. No pleural or  pericardial fluid. Heart is normal in size. Coronary artery  calcification is noted. Mediastinal and hilar lymph nodes are small in  size but likely reactive. No enlarged axillary lymph nodes. Large  right thyroid lobe nodule or cyst as described on recent chest CT. No  evidence of " pulmonary embolism. Thoracic aorta is unremarkable.  Scattered calcified plaque is noted. No evidence of aortic aneurysm or  dissection. Limited images upper abdomen demonstrate stable nodularity  to the left adrenal gland. Right hepatic lobe cyst is unchanged.  Degenerative spine changes are noted.      Impression    IMPRESSION:  1. No evidence of pulmonary embolism. Thoracic aorta is unremarkable.  2. Residual inflammatory changes in the lung bases improved since  prior CT. Reactive adenopathy in the mediastinum and hilar regions is  stable if not improved.  3. Stable left adrenal nodularity and stable liver cyst since recent  chest CT.    SANIA MARC MD   CBC with platelets differential   Result Value Ref Range    WBC 13.7 (H) 4.0 - 11.0 10e9/L    RBC Count 4.19 3.8 - 5.2 10e12/L    Hemoglobin 14.1 11.7 - 15.7 g/dL    Hematocrit 41.7 35.0 - 47.0 %     78 - 100 fl    MCH 33.7 (H) 26.5 - 33.0 pg    MCHC 33.8 31.5 - 36.5 g/dL    RDW 13.6 10.0 - 15.0 %    Platelet Count 201 150 - 450 10e9/L    Diff Method Automated Method     % Neutrophils 78.3 %    % Lymphocytes 11.1 %    % Monocytes 8.1 %    % Eosinophils 1.8 %    % Basophils 0.6 %    % Immature Granulocytes 0.1 %    Absolute Neutrophil 10.7 (H) 1.6 - 8.3 10e9/L    Absolute Lymphocytes 1.5 0.8 - 5.3 10e9/L    Absolute Monocytes 1.1 0.0 - 1.3 10e9/L    Absolute Eosinophils 0.3 0.0 - 0.7 10e9/L    Absolute Basophils 0.1 0.0 - 0.2 10e9/L    Abs Immature Granulocytes 0.0 0 - 0.4 10e9/L   Comprehensive metabolic panel   Result Value Ref Range    Sodium 144 133 - 144 mmol/L    Potassium 3.6 3.4 - 5.3 mmol/L    Chloride 106 94 - 109 mmol/L    Carbon Dioxide 29 20 - 32 mmol/L    Anion Gap 9 3 - 14 mmol/L    Glucose 97 70 - 99 mg/dL    Urea Nitrogen 12 7 - 30 mg/dL    Creatinine 0.72 0.52 - 1.04 mg/dL    GFR Estimate 81 >60 mL/min/1.7m2    GFR Estimate If Black >90   GFR Calc   >60 mL/min/1.7m2    Calcium 8.6 8.5 - 10.1 mg/dL    Bilirubin Total 0.8  0.2 - 1.3 mg/dL    Albumin 3.0 (L) 3.4 - 5.0 g/dL    Protein Total 6.7 (L) 6.8 - 8.8 g/dL    Alkaline Phosphatase 65 40 - 150 U/L    ALT 42 0 - 50 U/L    AST 35 0 - 45 U/L   Blood gas venous   Result Value Ref Range    Ph Venous 7.39 7.32 - 7.43 pH    PCO2 Venous 51 (H) 40 - 50 mm Hg    PO2 Venous 31 25 - 47 mm Hg    Bicarbonate Venous 31 (H) 21 - 28 mmol/L    Base Excess Venous 5.3 mmol/L   Nt probnp inpatient (BNP)   Result Value Ref Range    N-Terminal Pro BNP Inpatient 297 0 - 900 pg/mL   Troponin I   Result Value Ref Range    Troponin I ES 0.255 (HH) 0.000 - 0.045 ug/L   Troponin I (second draw)   Result Value Ref Range    Troponin I ES 0.281 (HH) 0.000 - 0.045 ug/L       Recent or prior diagnostic imaging and work-up:  CT CHEST PULMONARY EMBOLISM W CONTRAST 2/6/2017 3:10 PM     HISTORY: Short of breath. Elevated d-dimer.     CONTRAST: 70mL Isovue-370.     TECHNIQUE: CT of the chest is performed with IV contrast per pulmonary  embolus protocol.     This study is particularly tailored to assess the pulmonary arteries  and their branch vessels. Other assessed structures include the  lungs, mediastinum, pleura, and chest wall.     Radiation dose for this scan is reduced using automated exposure  control, adjustment of the mA and/or kV according to patient size, or  iterative reconstruction technique.     COMPARISON: None.     FINDINGS: Assessment of the right lung shows mixed interstitial and  nodular infiltrates, especially of the right lower lobe where more  confluent abnormality is present. Mild traction bronchiectasis in the  right middle lobe and right lower lobe are suggested. Assessment the  left lung shows interstitial and nodular infiltrates, mainly in the  left lower lobe. More confluent opacity is seen posteromedially in the  left lower lobe. Mild traction bronchiectasis is present in the  lingula and left lower lobe. Bilateral hilar and subcarinal adenopathy  are present. A dominant right hilar lymph  node on image #52 measures  1.2 cm. A subcarinal lymph node on image #57 measures 1.1 cm. A left  hilar lymph node on image #60 measures 1 cm. The thoracic aorta is  normal in caliber with no dissection. There is a 3.5 cm cyst or nodule  in the right lobe of the thyroid gland. The upper visualized portions  of the abdomen show 0.8 cm right lobe liver lesion on image #114. A  cyst is favored.         IMPRESSION:  1. Bilateral lung parenchymal abnormalities are present, especially  in the lower lung zones. Inflammatory/infectious etiologies are  favored.  2. Mild mediastinal and hilar adenopathy are present. They are  nonspecific. They could be reactive, given the lung findings.  3. No CT evidence of pulmonary embolus.     DAVID GONGORA MD      1:13 PM Patient assessed.    Assessments & Plan (with Medical Decision Making)   Clinical Impression: 66-year-old female with elevated troponin, cannot exclude Non-STEMI, query stable angina. Recent hospitalization for acute respiratory failure with hypoxia and community acquired pneumonia and symptoms suggestive of asthma exacerbation who was referred from clinic for hypoxia and need for further evaluation by pulmonary medicine with recent pulmonary function tests. Patient was hospitalized from February 6 -February 8 after CT chest showed bilateral parenchymal abnormalities in the lower lobes with mediastinal hilar adenopathy. Patient also did incidentally have a right thyroid lobe nodule which was concerning for mass and a fine-needle aspiration was recommended for interval follow-up. Patient was reported to be 79% on room air and was noted to be 89% on 2 L nasal cannula in clinic.    Per nursing triage patient had normal pulse oximetry when the pulse ox was placed over the earlobe. As I walk in the room the patient is no acute distress. She is % on room air.  Patient does has raynauds in her  Hands and are normally cold by report.  Patient reports she was doing well  since her hospital discharge, however she noted recently she has had increasing exertional fatigue and shortness of breath with activity that improves with rest.  She reports no prior history of heart disease. She reports aside from her asthma she has done well.  She is a non-smoker. Lungs are clear to auscultation on my exam she has no rubs or gallops and a normal cardiac exam.  She reports no lower extremity swelling and has no pitting edema on exam.      ED Course and Plan:   I reviewed her recent hospital course and discharge summary from February 6.  I also reviewed her CT imaging from February 6 which did not show any pulmonary embolism but lung abnormalities, see detailed report above. We had a discussion about rationale for transfer from clinic to the ED for further reevaluation.  Patient was frustrated that she was being seen for follow-up and is not sure why she was sent to the emergency department.  She inquired if her symptoms are due to fatigue as her  was recently seen for pneumonia.  We agreed to an evaluation and assessment to exclude cardiac and or pulmonary causes for her exertional fatigue and shortness of breath.  I elected to re-CT her chest to exclude worsening or interval changes in the pulmonary parenchyma.  EKG showed a normal axis, hyperacute ST segments in V1,nonspecific T-wave changes but no acute ischemia or arrhythmia.    Arrival troponin is 0.225 which is concerning for an Non- STEMI. With patient's exertional shortness of breath and symptoms it could represent  stable angina or be a cardiac equivalent.  Her interval CT for comparison today shows some improvement in the pulmonary findings and interval resolution of what appears to be resolving pneumonia.  Please see radiologist interpretation in detailed report above.  Given her elevated troponin I reviewed this with pattient and the need for intervention by cardiology including consideration of coronary angiogram. Patient  initially request transfer to [facility] which is at capacity.  After discussing additional options for care she requested transfer [facility] or [facility]. Patient was ultimately transferred to [facility] as [facility] was also at capacity.  I spoke with Dr. YURIDIA Higgins- admitting hospitalist at [facility] at 4:50 PM who agreed to assume care and transfer after reviewing rationale for transfer.  We discussed IV heparin.  Given possibility of non- STEMI patient was started on IV heparin with pharmacy to dose with a bolus.  Patient and family and notified about transfer and plan of care. Delta troponin obtained 3 hours after arrival troponin is 0.281. She remained otherwise hemodynamically stable             Disclaimer: This note consists of symbols derived from keyboarding, dictation and/or voice recognition software. As a result, there may be errors in the script that have gone undetected. Please consider this when interpreting information found in this chart.      I have reviewed the nursing notes.    I have reviewed the findings, diagnosis, plan and need for follow up with the patient.    New Prescriptions    No medications on file       Final diagnoses:   Dyspnea on exertion   Non-STEMI (non-ST elevated myocardial infarction) (H)     This document serves as a record of the services and decisions personally performed and made by Ad Hurd. The HPI was created on his behalf by Agata Leos, a trained medical scribe. The creation of this document is based on the provider's statements to the medical scribe.  Agata Leos 12:48 PM February 23, 2017    Provider:    The information in this document created by the medical scribe for me, accurately reflects the services I personally performed and the decisions made by me. I have reviewed and approved this document for accuracy prior to leaving the patient care area.  Ad Hurd 12:48 PM February 23, 2017 2/23/2017    [facility] EMERGENCY DEPARTMENT     Ad Hurd MD  02/23/17 9632

## 2017-02-23 NOTE — IP AVS SNAPSHOT
[facility]    [address]    [facility] [address]    Phone:  [phone]                                       After Visit Summary   2/23/2017    aKren Guevara    MRN: [id number]           After Visit Summary Signature Page     I have received my discharge instructions, and my questions have been answered. I have discussed any challenges I see with this plan with the nurse or doctor.    ..........................................................................................................................................  Patient/Patient Representative Signature      ..........................................................................................................................................  Patient Representative Print Name and Relationship to Patient    ..................................................               ................................................  Date                                            Time    ..........................................................................................................................................  Reviewed by Signature/Title    ...................................................              ..............................................  Date                                                            Time

## 2017-02-23 NOTE — NURSING NOTE
"Chief Complaint   Patient presents with     Asthma     follow up on pneumonia. Still feeling winded and tired alot. Cough is productive but clear now.      Pulmonary Function Testing     go over results       Initial /67 (BP Location: Right arm, Patient Position: Chair, Cuff Size: Adult Small)  Pulse 81  Temp 98  F (36.7  C) (Tympanic)  Ht 4' 11\" (1.499 m)  Wt 126 lb (57.2 kg)  SpO2 (!) 84%  BMI 25.45 kg/m2 Estimated body mass index is 25.45 kg/(m^2) as calculated from the following:    Height as of this encounter: 4' 11\" (1.499 m).    Weight as of this encounter: 126 lb (57.2 kg).  Medication Reconciliation: complete     Walked with patient checking oxygen statis.  On 2 with the 02 machine and walked 20 feet and dropped to 89%.  Sitting again with 2% of oxygen on went to 100 if didn't speak, if spoke dropped to 89 and then as low as 75 when speaking and having 02 on.  Turned off 02 when patient got back to 100% then stood and walkied less than 15 ft 02 down to 89 and at 20ft was down to 75.  Put her back on 2 with her oxygen and took 2 minutes to get 02 back to 94%.  "

## 2017-02-23 NOTE — PROGRESS NOTES
Clinic Care Coordination Contact  Care Team Conversations    Clinical Data: RN CC was contacted by pt's PCP as pt is in clinic with low oxygen sats. Pt would prefer to be sent home with oxygen versus going to the hospital for admission. Pt was seen and treated for pneumonia 2 weeks ago. RN CC contacted Vikas RIVERA of which obtaining outpt oxygen can be an up to 30 day process per Medicare guidelines.   This was discussed with PCP. She spoke with pt and pt is agreeable to be transferred to the ED.     Pt was in the ED at 2 am with her  who was also diagnosis with pneumonia. Pt has had little sleep and verbalizes she is worn out. Spouse is at home at this time. She is worried about him and wants to be home to take care of him. Spouse is safe at home on his own. He cannot drive, but is cognitively intact per pt to take care of himself and make decisions. Pt has 2 daughters, Gloria and Benoit. Daughter's are involved with pt and spouse. Authorization to Discuss Protected Health Information signed by pt today to be able to talk to Benoit Castro and spouse Javi.     Pt requested that RN CC would call her daughter, Gloria to explain what is going on. Gloria was updated. She is going to plan to come to  pt's spouse and come to the ED. Daughter is worried about her mother as she tends to take care of everyone else and not herself. Daughter feels that there has been something different with pt's breathing since last spring. Daughter is on board for pt to receive any care necessary to figure out what is going on with her mother.     Plan: RN CC will review chart every 1-2 days and will plan to f/u with pt once she returns home. Pt should consider going to the [facility] for admission with hopes of a pulmonology referral to be completed while she is in the hospital (PCP would prefer this). Daughter aware that the pt will need a pulmonary consult whether it occurs inpt or outpt.     Alka Green  RN-BSN  RN Clinic Care Coordinator  [facility]  [phone]

## 2017-02-23 NOTE — ED NOTES
Bed: IN01  Expected date: 2/23/17  Expected time: 12:30 PM  Means of arrival: Wheelchair  Comments:  Karen Guevara  MR #[id number]  66-year-old female presenting from the clinic with hypoxia  Assessment the clinic completed by Dr. Lavern Jefferson  Oxygen saturation was 79% on room air/89% on 2 L  Tachypneic    Recent hospitalization February 6 for hospital-acquired pneumonia CT showed an inflammatory versus infectious interstitial process(refer to CT report from February 6)- uncertain at this his on  going hypoxia from unresolved infection if she could have some other inflammatory interstitial lung disease.  Most likely will need pulmonary consult to help sort this out.  Upon my function testing today showed DLCO to be reflective of a significant drop in her lung diffusing capacity this could be secondary to ongoing pneumonia versus an inflammatory interstitial lung process or alveolar process

## 2017-02-23 NOTE — PATIENT INSTRUCTIONS
Thank you for choosing [facility].  You may be receiving a survey in the mail from [facility] regarding your visit today.  Please take a few minutes to complete and return the survey to let us know how we are doing.      If you have questions or concerns, please contact us via [employer] or you can contact your care team at [phone].    Our Clinic hours are:  Monday 6:40 am  to 7:00 pm  Tuesday -Friday 6:40 am to 5:00 pm    The [place] outpatient lab hours are:  Monday - Friday 6:10 am to 4:45 pm  Saturdays 7:00 am to 11:00 am  Appointments are required, call [phone]    If you have clinical questions after hours or would like to schedule an appointment,  call the clinic at [phone].      [facility] [address] [place] [address]      1. See Lung doctor  2. Wear Oxygen 2 L  3. Care coordinator referral placed.

## 2017-02-23 NOTE — IP AVS SNAPSHOT
MRN:[id number]                      After Visit Summary   2/23/2017    Karen Guevara    MRN: [id number]           Thank you!     Thank you for choosing [facility] for your care. Our goal is always to provide you with excellent care. Hearing back from our patients is one way we can continue to improve our services. Please take a few minutes to complete the written survey that you may receive in the mail after you visit with us. Thank you!        Patient Information     Date Of Birth          1950        About your hospital stay     You were admitted on:  February 23, 2017 You last received care in the:  [facility]    You were discharged on:  February 24, 2017        Reason for your hospital stay       SOB                  Who to Call     For medical emergencies, please call 911.  For non-urgent questions about your medical care, please call your primary care provider or clinic, [phone]          Attending Provider     Provider Specialty    Chris Higgins MD Internal Medicine    [facility] Lelia Redding MD Internal Medicine       Primary Care Provider Office Phone # Fax #    Lavern Jefferson [phone]       [facility] [address] [place] [id number]        After Care Instructions     Activity       Your activity upon discharge: activity as tolerated            Diet       Follow this diet upon discharge: Orders Placed This Encounter      Combination Diet Low Saturated Fat Na <2400mg Diet                  Follow-up Appointments     Follow-up and recommended labs and tests        Follow up with primary care provider, Lavern Jefferson, within 7 days for hospital follow- up.  No follow up labs or test are needed.                  Pending Results     No orders found for last 3 day(s).            Statement of Approval     Ordered          02/24/17 1551  I have reviewed and agree with all the recommendations and orders detailed  "in this document.  EFFECTIVE NOW     Approved and electronically signed by:  Lelia Andersen MD             Admission Information     Date & Time Provider Department Dept. Phone    2017 Lelia Andersen MD [facility] [phone]      Your Vitals Were     Blood Pressure Temperature Respirations Height Weight Pulse Oximetry    101/53 97.4  F (36.3  C) (Oral) 35 1.499 m (4' 11\") 55.4 kg (122 lb 2.2 oz) 96%    BMI (Body Mass Index)                   24.67 kg/m2           MyChart Information     [employer] lets you send messages to your doctor, view your test results, renew your prescriptions, schedule appointments and more. To sign up, go to [email or web address] . Click on \"Log in\" on the left side of the screen, which will take you to the Welcome page. Then click on \"Sign up Now\" on the right side of the page.     You will be asked to enter the access code listed below, as well as some personal information. Please follow the directions to create your username and password.     Your access code is: [email or web address]  Expires: 2017 12:06 PM     Your access code will  in 90 days. If you need help or a new code, please call your [facility] clinic or [phone].        Care EveryWhere ID     This is your Care EveryWhere ID. This could be used by other organizations to access your [facility] medical records  [id number]           Review of your medicines      UNREVIEWED medicines. Ask your doctor about these medicines        Dose / Directions    vitamin B complex with vitamin C Tabs tablet        Dose:  1 tablet   Take 1 tablet by mouth daily Reported on 2/15/2017   Refills:  0         START taking        Dose / Directions    aspirin 81 MG chewable tablet        Dose:  81 mg   Take 1 tablet (81 mg) by mouth daily   Quantity:  36 tablet   Refills:  0         CONTINUE these medicines which have NOT CHANGED        Dose / Directions    * albuterol 108 (90 BASE) MCG/ACT " Inhaler   Commonly known as:  VENTOLIN HFA   Used for:  Mild persistent asthma with exacerbation        Dose:  2 puff   Inhale 2 puffs into the lungs every 4 hours as needed for shortness of breath / dyspnea or wheezing   Quantity:  1 Inhaler   Refills:  11       * albuterol (2.5 MG/3ML) 0.083% neb solution        Dose:  1 vial   Take 1 vial (2.5 mg) by nebulization every 4 hours as needed for shortness of breath / dyspnea or wheezing   Quantity:  1 Box   Refills:  11       cholecalciferol 1000 UNIT tablet   Commonly known as:  vitamin D        Reported on 2/15/2017   Refills:  0       CHOLEST OFF PO        take one tablet by mouth when eating a meal that is fatty   Refills:  0       DAILY HERBS IMMUNE DEFENSE PO        Dose:  2 capsule   Take 2 capsules by mouth every evening Reported on 2/15/2017   Refills:  0       fexofenadine 180 MG tablet   Commonly known as:  ALLEGRA        Dose:  180 mg   Take 1 tablet (180 mg) by mouth daily   Quantity:  30 tablet   Refills:  1       fluticasone 50 MCG/ACT spray   Commonly known as:  FLONASE        Dose:  1 spray   Spray 1 spray into both nostrils 2 times daily   Quantity:  1 Bottle   Refills:  11       KRILL OIL PO        Refills:  0       order for DME   Used for:  Acute respiratory failure with hypoxia (H)        Equipment being ordered: Oxygen   Quantity:  1 each   Refills:  11       triamcinolone 0.1 % cream   Commonly known as:  KENALOG   Used for:  Rash of hands        Apply sparingly to affected area three times daily for 14 days.   Quantity:  15 g   Refills:  0       Turmeric Powd        Refills:  0       VITAMIN A PO        Dose:  1 capsule   Take 1 capsule by mouth daily Reported on 2/23/2017   Refills:  0       * Notice:  This list has 2 medication(s) that are the same as other medications prescribed for you. Read the directions carefully, and ask your doctor or other care provider to review them with you.         Where to get your medicines      These  medications were sent to [facility] [place] - [address]  [address] [place] [address]     Phone:  [phone]     aspirin 81 MG chewable tablet                Protect others around you: Learn how to safely use, store and throw away your medicines at www.disposemymeds.org.             Medication List: This is a list of all your medications and when to take them. Check marks below indicate your daily home schedule. Keep this list as a reference.      Medications           Morning Afternoon Evening Bedtime As Needed    * albuterol 108 (90 BASE) MCG/ACT Inhaler   Commonly known as:  VENTOLIN HFA   Inhale 2 puffs into the lungs every 4 hours as needed for shortness of breath / dyspnea or wheezing                                * albuterol (2.5 MG/3ML) 0.083% neb solution   Take 1 vial (2.5 mg) by nebulization every 4 hours as needed for shortness of breath / dyspnea or wheezing                                aspirin 81 MG chewable tablet   Take 1 tablet (81 mg) by mouth daily   Last time this was given:  81 mg on 2/24/2017  2:53 PM                                   cholecalciferol 1000 UNIT tablet   Commonly known as:  vitamin D   Reported on 2/15/2017                                   CHOLEST OFF PO   take one tablet by mouth when eating a meal that is fatty                                DAILY HERBS IMMUNE DEFENSE PO   Take 2 capsules by mouth every evening Reported on 2/15/2017                                fexofenadine 180 MG tablet   Commonly known as:  ALLEGRA   Take 1 tablet (180 mg) by mouth daily   Last time this was given:  180 mg on 2/24/2017  2:48 PM                                   fluticasone 50 MCG/ACT spray   Commonly known as:  FLONASE   Spray 1 spray into both nostrils 2 times daily   Last time this was given:  1 spray on 2/24/2017 10:19 AM                                      KRILL OIL PO                                order for DME   Equipment being  ordered: Oxygen                                triamcinolone 0.1 % cream   Commonly known as:  KENALOG   Apply sparingly to affected area three times daily for 14 days.                                Turmeric Powd                                VITAMIN A PO   Take 1 capsule by mouth daily Reported on 2/23/2017                                   vitamin B complex with vitamin C Tabs tablet   Take 1 tablet by mouth daily Reported on 2/15/2017                                   * Notice:  This list has 2 medication(s) that are the same as other medications prescribed for you. Read the directions carefully, and ask your doctor or other care provider to review them with you.

## 2017-02-23 NOTE — PHARMACY-ANTICOAGULATION SERVICE
Patient to start the heparin C-V/Vascular protocol with a goal anti 10a level of 0.15-0.35. Using a HT of 59 inches and a WT of 55.8 kg, a dosing WT of 49.6 kg was calculated. Based on this dosing WT, give patient a heparin bolus of 3000 units from the bag and then start a drip at 600 units/hr. Check the patient's anti 10a level 6 hours after starting the drip at ~2330.   Per C-V/Vascular protocol.  Main HornD.

## 2017-02-23 NOTE — NURSING NOTE
"Chief Complaint   Patient presents with     Asthma     follow up on pneumonia. Still feeling winded and tired alot. Cough is productive but clear now.      Pulmonary Function Testing     go over results       Initial /67 (BP Location: Right arm, Patient Position: Chair, Cuff Size: Adult Small)  Pulse 81  Temp 98  F (36.7  C) (Tympanic)  Ht 4' 11\" (1.499 m)  Wt 126 lb (57.2 kg)  SpO2 (!) 84%  BMI 25.45 kg/m2 Estimated body mass index is 25.45 kg/(m^2) as calculated from the following:    Height as of this encounter: 4' 11\" (1.499 m).    Weight as of this encounter: 126 lb (57.2 kg).  Medication Reconciliation: complete  "

## 2017-02-23 NOTE — MR AVS SNAPSHOT
After Visit Summary   2/23/2017    Karen Guevara    MRN: [id number]           Patient Information     Date Of Birth          1950        Visit Information        Provider Department      2/23/2017 11:00 AM Lavern Jefferson, DO [facility]        Today's Diagnoses     Acute respiratory failure with hypoxia (H)    -  1      Care Instructions          Thank you for choosing [facility].  You may be receiving a survey in the mail from [employer] regarding your visit today.  Please take a few minutes to complete and return the survey to let us know how we are doing.      If you have questions or concerns, please contact us via [employer] or you can contact your care team at [phone].    Our Clinic hours are:  Monday 6:40 am  to 7:00 pm  Tuesday -Friday 6:40 am to 5:00 pm    The [place] outpatient lab hours are:  Monday - Friday 6:10 am to 4:45 pm  Saturdays 7:00 am to 11:00 am  Appointments are required, call [phone]    If you have clinical questions after hours or would like to schedule an appointment,  call the clinic at [phone].      [facility] [address]      1. See Lung doctor  2. Wear Oxygen 2 L  3. Care coordinator referral placed.        Follow-ups after your visit        Additional Services     CARE COORDINATION REFERRAL       Services are provided by a Care Coordinator for people with complex needs such as: medical, social, or financial troubles.  The Care Coordinator works with the patient and their Primary Care Provider to determine health goals, obtain resources, achieve outcomes, and develop care plans that help coordinate the patient's care.     Reason for Referral: Concerns with ADLs/IADLs and Frequent Hospital / SNF / ED Visits    Provide additional details for Care Coordination to best meet the patient's current needs: stat pulm appointment, getting home O2    Clinical Staff have discussed the Care Coordination  "Referral with the patient and/or caregiver: yes            PULMONARY MEDICINE REFERRAL       Your provider has referred you to: [facility]:   [facility] Pulmonary Diagnostic Lab [facility] [phone]   [email or web address]    Please be aware that coverage of these services is subject to the terms and limitations of your health insurance plan.  Call member services at your health plan with any benefit or coverage questions.      Please bring the following with you to your appointment:    (1) Any X-Rays, CTs or MRIs which have been performed.  Contact the facility where they were done to arrange for  prior to your scheduled appointment.    (2) List of current medications   (3) This referral request   (4) Any documents/labs given to you for this referral                  Who to contact     If you have questions or need follow up information about today's clinic visit or your schedule please contact [facility] directly at [phone].  Normal or non-critical lab and imaging results will be communicated to you by MyChart, letter or phone within 4 business days after the clinic has received the results. If you do not hear from us within 7 days, please contact the clinic through MyChart or phone. If you have a critical or abnormal lab result, we will notify you by phone as soon as possible.  Submit refill requests through [employer] or call your pharmacy and they will forward the refill request to us. Please allow 3 business days for your refill to be completed.          Additional Information About Your Visit        [employer] Information     [employer] lets you send messages to your doctor, view your test results, renew your prescriptions, schedule appointments and more. To sign up, go to [email or web address] . Click on \"Log in\" on the left side of the screen, which will take you to the Welcome page. Then " "click on \"Sign up Now\" on the right side of the page.     You will be asked to enter the access code listed below, as well as some personal information. Please follow the directions to create your username and password.     Your access code is: [email or web address]  Expires: 2017 12:06 PM     Your access code will  in 90 days. If you need help or a new code, please call your [facility] clinic or [phone].        Care EveryWhere ID     This is your Care EveryWhere ID. This could be used by other organizations to access your [facility] medical records  [id number]        Your Vitals Were     Pulse Temperature Height Pulse Oximetry BMI (Body Mass Index)       81 98  F (36.7  C) (Tympanic) 4' 11\" (1.499 m) 84% 25.45 kg/m2        Blood Pressure from Last 3 Encounters:   17 127/67   02/15/17 138/76   17 115/68    Weight from Last 3 Encounters:   17 126 lb (57.2 kg)   02/15/17 123 lb 6.4 oz (56 kg)   17 126 lb 8.7 oz (57.4 kg)              We Performed the Following     CARE COORDINATION REFERRAL     PULMONARY MEDICINE REFERRAL          Today's Medication Changes          These changes are accurate as of: 17 11:48 AM.  If you have any questions, ask your nurse or doctor.               Start taking these medicines.        Dose/Directions    order for DME   Used for:  Acute respiratory failure with hypoxia (H)   Started by:  Lavern Jefferson DO        Equipment being ordered: Oxygen   Quantity:  1 each   Refills:  11            Where to get your medicines      Some of these will need a paper prescription and others can be bought over the counter.  Ask your nurse if you have questions.     Bring a paper prescription for each of these medications     order for DME                Primary Care Provider Office Phone # Fax #    Lavern Jefferson [phone]       [facility] [address] [place] [address]        Thank you!     Thank you for choosing " [facility]  for your care. Our goal is always to provide you with excellent care. Hearing back from our patients is one way we can continue to improve our services. Please take a few minutes to complete the written survey that you may receive in the mail after your visit with us. Thank you!             Your Updated Medication List - Protect others around you: Learn how to safely use, store and throw away your medicines at www.disposemymeds.org.          This list is accurate as of: 2/23/17 11:48 AM.  Always use your most recent med list.                   Brand Name Dispense Instructions for use    * albuterol 108 (90 BASE) MCG/ACT Inhaler    VENTOLIN HFA    1 Inhaler    Inhale 2 puffs into the lungs every 4 hours as needed for shortness of breath / dyspnea or wheezing       * albuterol (2.5 MG/3ML) 0.083% neb solution     1 Box    Take 1 vial (2.5 mg) by nebulization every 4 hours as needed for shortness of breath / dyspnea or wheezing       cholecalciferol 1000 UNIT tablet    vitamin D     Reported on 2/15/2017       CHOLEST OFF PO      take one tablet by mouth when eating a meal that is fatty       coenzyme Q-10 capsule      Take 1 capsule by mouth daily as needed Reported on 2/15/2017       DAILY HERBS IMMUNE DEFENSE PO      Take 2 capsules by mouth every evening Reported on 2/15/2017       fexofenadine 180 MG tablet    ALLEGRA    30 tablet    Take 1 tablet (180 mg) by mouth daily       fluticasone 50 MCG/ACT spray    FLONASE    1 Bottle    Spray 1 spray into both nostrils 2 times daily       KRILL OIL PO          OIL OF [place] PO      Reported on 2/23/2017       order for DME     1 each    Equipment being ordered: Oxygen       triamcinolone 0.1 % cream    KENALOG    15 g    Apply sparingly to affected area three times daily for 14 days.       Turmeric Powd          VITAMIN A PO      Take 1 capsule by mouth daily Reported on 2/23/2017       vitamin B complex with vitamin C Tabs tablet      Take 1  tablet by mouth daily Reported on 2/15/2017       Zinc 25 MG Tabs      Take 0.5 tablets by mouth daily Reported on 2/15/2017       * Notice:  This list has 2 medication(s) that are the same as other medications prescribed for you. Read the directions carefully, and ask your doctor or other care provider to review them with you.

## 2017-02-23 NOTE — ED NOTES
"Patient said, \"I was at the clinic today for a follow up appointment on my pneumonia.  I don't have chest pain or difficulty breathing.  I do have asthma.  I do get tired when I walk.\"  "

## 2017-02-24 ENCOUNTER — APPOINTMENT (OUTPATIENT)
Dept: NUCLEAR MEDICINE | Facility: CLINIC | Age: 67
End: 2017-02-24
Attending: INTERNAL MEDICINE
Payer: COMMERCIAL

## 2017-02-24 ENCOUNTER — APPOINTMENT (OUTPATIENT)
Dept: CARDIOLOGY | Facility: CLINIC | Age: 67
End: 2017-02-24
Attending: PHYSICIAN ASSISTANT
Payer: COMMERCIAL

## 2017-02-24 VITALS
RESPIRATION RATE: 35 BRPM | SYSTOLIC BLOOD PRESSURE: 101 MMHG | OXYGEN SATURATION: 96 % | DIASTOLIC BLOOD PRESSURE: 53 MMHG | WEIGHT: 122.14 LBS | HEIGHT: 59 IN | BODY MASS INDEX: 24.62 KG/M2 | TEMPERATURE: 97.4 F

## 2017-02-24 PROBLEM — R07.89 CHEST PAIN, ATYPICAL: Status: ACTIVE | Noted: 2017-02-24

## 2017-02-24 LAB
CHOLEST SERPL-MCNC: 201 MG/DL
HDLC SERPL-MCNC: 84 MG/DL
LACTATE BLD-SCNC: 1 MMOL/L (ref 0.7–2.1)
LDLC SERPL CALC-MCNC: 103 MG/DL
LMWH PPP CHRO-ACNC: 0.31 IU/ML
LMWH PPP CHRO-ACNC: 0.31 IU/ML
NONHDLC SERPL-MCNC: 117 MG/DL
TRIGL SERPL-MCNC: 70 MG/DL
TROPONIN I SERPL-MCNC: 0.25 UG/L (ref 0–0.04)

## 2017-02-24 PROCEDURE — 36415 COLL VENOUS BLD VENIPUNCTURE: CPT | Performed by: INTERNAL MEDICINE

## 2017-02-24 PROCEDURE — 36415 COLL VENOUS BLD VENIPUNCTURE: CPT | Performed by: PHYSICIAN ASSISTANT

## 2017-02-24 PROCEDURE — 83605 ASSAY OF LACTIC ACID: CPT | Performed by: INTERNAL MEDICINE

## 2017-02-24 PROCEDURE — A9502 TC99M TETROFOSMIN: HCPCS | Performed by: INTERNAL MEDICINE

## 2017-02-24 PROCEDURE — G0378 HOSPITAL OBSERVATION PER HR: HCPCS

## 2017-02-24 PROCEDURE — 93018 CV STRESS TEST I&R ONLY: CPT | Performed by: INTERNAL MEDICINE

## 2017-02-24 PROCEDURE — 40000855 ZZH STATISTIC ECHO STRESS OR NM NPI

## 2017-02-24 PROCEDURE — 93017 CV STRESS TEST TRACING ONLY: CPT | Performed by: REHABILITATION PRACTITIONER

## 2017-02-24 PROCEDURE — 93306 TTE W/DOPPLER COMPLETE: CPT

## 2017-02-24 PROCEDURE — 99217 ZZC OBSERVATION CARE DISCHARGE: CPT | Performed by: INTERNAL MEDICINE

## 2017-02-24 PROCEDURE — 34300033 ZZH RX 343: Performed by: INTERNAL MEDICINE

## 2017-02-24 PROCEDURE — 78452 HT MUSCLE IMAGE SPECT MULT: CPT

## 2017-02-24 PROCEDURE — 99222 1ST HOSP IP/OBS MODERATE 55: CPT | Mod: 25 | Performed by: INTERNAL MEDICINE

## 2017-02-24 PROCEDURE — 80061 LIPID PANEL: CPT | Performed by: PHYSICIAN ASSISTANT

## 2017-02-24 PROCEDURE — 93306 TTE W/DOPPLER COMPLETE: CPT | Mod: 26 | Performed by: INTERNAL MEDICINE

## 2017-02-24 PROCEDURE — 84484 ASSAY OF TROPONIN QUANT: CPT | Performed by: PHYSICIAN ASSISTANT

## 2017-02-24 PROCEDURE — 25000125 ZZHC RX 250: Performed by: INTERNAL MEDICINE

## 2017-02-24 PROCEDURE — 85520 HEPARIN ASSAY: CPT | Performed by: PHYSICIAN ASSISTANT

## 2017-02-24 PROCEDURE — 85520 HEPARIN ASSAY: CPT | Performed by: INTERNAL MEDICINE

## 2017-02-24 PROCEDURE — 25000128 H RX IP 250 OP 636: Performed by: INTERNAL MEDICINE

## 2017-02-24 PROCEDURE — 78452 HT MUSCLE IMAGE SPECT MULT: CPT | Mod: 26 | Performed by: INTERNAL MEDICINE

## 2017-02-24 PROCEDURE — 25000132 ZZH RX MED GY IP 250 OP 250 PS 637: Performed by: PHYSICIAN ASSISTANT

## 2017-02-24 PROCEDURE — 93016 CV STRESS TEST SUPVJ ONLY: CPT | Performed by: INTERNAL MEDICINE

## 2017-02-24 RX ORDER — AMINOPHYLLINE 25 MG/ML
50-100 INJECTION, SOLUTION INTRAVENOUS
Status: DISCONTINUED | OUTPATIENT
Start: 2017-02-24 | End: 2017-02-24 | Stop reason: HOSPADM

## 2017-02-24 RX ORDER — ALBUTEROL SULFATE 90 UG/1
2 AEROSOL, METERED RESPIRATORY (INHALATION) EVERY 5 MIN PRN
Status: DISCONTINUED | OUTPATIENT
Start: 2017-02-24 | End: 2017-02-24 | Stop reason: HOSPADM

## 2017-02-24 RX ORDER — REGADENOSON 0.08 MG/ML
0.4 INJECTION, SOLUTION INTRAVENOUS ONCE
Status: COMPLETED | OUTPATIENT
Start: 2017-02-24 | End: 2017-02-24

## 2017-02-24 RX ORDER — ACYCLOVIR 200 MG/1
0-1 CAPSULE ORAL
Status: DISCONTINUED | OUTPATIENT
Start: 2017-02-24 | End: 2017-02-24 | Stop reason: HOSPADM

## 2017-02-24 RX ORDER — ASPIRIN 81 MG/1
81 TABLET, CHEWABLE ORAL DAILY
Qty: 36 TABLET | Refills: 0 | Status: SHIPPED | OUTPATIENT
Start: 2017-02-24

## 2017-02-24 RX ORDER — HYDRALAZINE HYDROCHLORIDE 20 MG/ML
20 INJECTION INTRAMUSCULAR; INTRAVENOUS EVERY 4 HOURS PRN
Status: DISCONTINUED | OUTPATIENT
Start: 2017-02-24 | End: 2017-02-24 | Stop reason: HOSPADM

## 2017-02-24 RX ADMIN — FEXOFENADINE HYDROCHLORIDE 180 MG: 180 TABLET, FILM COATED ORAL at 14:48

## 2017-02-24 RX ADMIN — REGADENOSON 0.4 MG: 0.08 INJECTION, SOLUTION INTRAVENOUS at 12:44

## 2017-02-24 RX ADMIN — FLUTICASONE PROPIONATE 1 SPRAY: 50 SPRAY, METERED NASAL at 10:19

## 2017-02-24 RX ADMIN — ASPIRIN 81 MG 81 MG: 81 TABLET ORAL at 14:53

## 2017-02-24 RX ADMIN — TETROFOSMIN 8.8 MCI.: 0.23 INJECTION, POWDER, LYOPHILIZED, FOR SOLUTION INTRAVENOUS at 09:55

## 2017-02-24 RX ADMIN — HYDRALAZINE HYDROCHLORIDE 20 MG: 20 INJECTION INTRAMUSCULAR; INTRAVENOUS at 10:14

## 2017-02-24 RX ADMIN — TETROFOSMIN 27 MCI.: 0.23 INJECTION, POWDER, LYOPHILIZED, FOR SOLUTION INTRAVENOUS at 12:47

## 2017-02-24 NOTE — PROGRESS NOTES
The nuclear stress test is normal. I would not advise further cardiac testing at this time. We will sign off.

## 2017-02-24 NOTE — PROGRESS NOTES
LEXISCAN STRESS TEST- Here and C/O chest tightness but no pain. Teaching done. 1250 Tolerated medication well. VSS. Report to MADDIE SAEZ.

## 2017-02-24 NOTE — PLAN OF CARE
Problem: Goal Outcome Summary  Goal: Goal Outcome Summary  Patient denied any chest pain, discomfort or dyspnea since admission.  Oxygen sats mid 90s on room air.  Waiting Cards consult.

## 2017-02-24 NOTE — PROGRESS NOTES
Pt. Came in with chest pain and increased coughing.  Trop high .26   Cardiology saw and NM Lexiscan stress test was completed and normal.   Hospitalist and Cardiology agree on discharge.   Discharge information reviewed and understood with patient and family   Discharge to home with relative driving

## 2017-02-24 NOTE — H&P
PRIMARY CARE PHYSICIAN:  Lavern Jefferson DO      CHIEF COMPLAINT:  Shortness of breath.      HISTORY OF PRESENT ILLNESS:  Karen Guevara is a very pleasant 66-year-old female with a past medical history of asthma, hyperlipidemia, hypothyroidism and recent community-acquired pneumonia who presented to clinic today, at [facility] due to continued dyspnea on exertion.  The patient was hospitalized from 02/06-02/08/2017 at [facility] for acute respiratory failure with hypoxia due to community-acquired pneumonia along with an asthma exacerbation.  She was treated with IV ceftriaxone while in the hospital and was discharged on a course of azithromycin.  She was also given a prednisone taper on discharge.  During her hospitalization, it was also noted that she had a systolic heart murmur but no echo was performed as this was felt to be likely benign.  The patient will follow up with Dr. Jefferson in clinic today on 02/23 at which point her oxygen saturation was noted to be 79% on room air and 89% on 2 liters of oxygen.  The patient desaturated with speaking and walking despite being on 2 liters of oxygen.  There is also some concern for interstitial lung diseases as patient had recent pulmonary function tests that showed reduced DLCO.  She is a nonsmoker.  Due to concerns for her shortness of breath, she was sent to the Emergency Department for further care and evaluation.  Also notable is the patient describes chest pressure that comes on with exertion and was relieved with rest.  She denies any other fevers, chills, headache, lightheadedness, diaphoresis, abdominal pain, nausea, vomiting, diarrhea, dysuria or focal weakness.  She reports being under increased stress due to her  being ill and he needed to go to the Emergency Department this morning for pneumonia.  She has increased fatigue and poor sleep.  In the Emergency Department, patient was evaluated by Dr. Hurd.  She was found to have elevated  blood pressure.  Labs were obtained that showed an elevated troponin 0.255 and a WBC of 13.7.  Her venous blood gas showed a pH of 7.39 with pCO2 of 51 and a bicarbonate 31.  Her NT-proBNP was within normal limits.  She had a CT chest with contrast that was negative for any pulmonary embolism.  The thoracic aorta was unremarkable.  There were residual inflammatory changes in the lung bases which were improved since her prior CT.  Reactive adenopathy in the mediastinum and hilar region was stable, if not improved.  Her EKG showed normal sinus rhythm with T-wave inversions in lateral leads.  Given concern for NSTEMI, the patient was placed on IV heparin.  She was also given a full dose aspirin.  She was transferred to [facility] CICU for further monitoring and cares.      The patient is currently resting comfortably in bed.  She denies any pain or shortness of breath at this time.  She still has elevated blood pressure up in the 180s systolic, but attributes this to stress.  She states that she normally has a well-controlled blood pressure is not on any medications for this.  She denies any history of hyperlipidemia.  She states her father had heart disease and  in his early 60s.  She is a nonsmoker and has no history of diabetes.  Her repeat troponin level increased to 0.281.  She otherwise voices no concerns at this time.      PAST MEDICAL HISTORY:   1.  Vitiligo.    2.  Hypothyroidism, status post partial thyroidectomy.   3.  Hyperlipidemia.   4.  Mild intermittent asthma and a recent exacerbation due to pneumonia.   5.  Recent hospitalization from -2017 for community-acquired pneumonia.   6.  Allergic rhinitis.   7.  Possible interstitial lung disease.      PAST SURGICAL HISTORY: Partial left thyroidectomy in ; otherwise,  no previous surgeries.      SOCIAL HISTORY:  The patient is a lifelong nonsmoker.  She drinks alcohol rarely.  No illicit drug use.  She is  and her   and daughter accompany her to the Emergency Department.      PRIOR TO ADMISSION MEDICATIONS:    Prior to Admission medications    Medication Sig Last Dose Taking? Auth Provider   KRILL OIL PO  2/23/2017 at am Yes Reported, Patient   [place]rosel[place] POWD  2/23/2017 at Unknown time Yes Reported, Patient   albuterol (2.5 MG/3ML) 0.083% neb solution Take 1 vial (2.5 mg) by nebulization every 4 hours as needed for shortness of breath / dyspnea or wheezing Past Week at Unknown time Yes Nik Jeong MD   triamcinolone (KENALOG) 0.1 % cream Apply sparingly to affected area three times daily for 14 days. 2/22/2017 at Unknown time Yes Nik Jeong MD   albuterol (VENTOLIN HFA) 108 (90 BASE) MCG/ACT Inhaler Inhale 2 puffs into the lungs every 4 hours as needed for shortness of breath / dyspnea or wheezing 2/23/2017 at am Yes Harvey Sainz MD   VITAMIN A PO Take 1 capsule by mouth daily Reported on 2/23/2017 Past Week at Unknown time Yes Reported, Patient   Misc Natural Products (DAILY HERBS IMMUNE DEFENSE PO) Take 2 capsules by mouth every evening Reported on 2/15/2017 2/22/2017 at Unknown time Yes Reported, Patient   vitamin B complex with vitamin C (VITAMIN  B COMPLEX) TABS tablet Take 1 tablet by mouth daily Reported on 2/15/2017 Past Week at Unknown time Yes Reported, Patient   fexofenadine (ALLEGRA) 180 MG tablet Take 1 tablet (180 mg) by mouth daily 2/23/2017 at am Yes Wilfrido Chow MD   fluticasone (FLONASE) 50 MCG/ACT nasal spray Spray 1 spray into both nostrils 2 times daily  2/23/2017 at am Yes Wilfrido Chow MD   VITAMIN D 1000 UNIT OR TABS Reported on 2/15/2017 Past Week at Unknown time Yes Reported, Patient   order for DME Equipment being ordered: Lavern Soni, DO   CHOLEST OFF OR take one tablet by mouth when eating a meal that is fatty More than a month at Unknown time  Reported, Patient      ALLERGIES:  Amoxicillin and Claritin.      REVIEW OF SYSTEMS:  A complete  10-point review of systems was performed and is negative other than the items previously mentioned above HPI.      PHYSICAL EXAMINATION:   VITAL SIGNS:  Blood pressure 181/86, heart rate 75 beats per minute, temperature 99.2, respiratory rate 18, oxygen saturation 97% on room air.   GENERAL:  The patient is alert, oriented to person, place, date and situation, cooperative, lying in bed in no apparent distress.   HEENT:  Pupils equal, round, extraocular movements intact.  Head normocephalic.  Throat, lips, mucosa and tongue appear moist and normal.  Posterior pharynx clear.   NECK:  Supple.  Thyroid is nonpalpable on the left side, palpable on the right with no nodules felt.   CARDIOVASCULAR:  Heart regular rate and rhythm, grade 2/6 systolic murmur heard greatest at the apex.  No edema.  Peripheral pulses are intact.   PULMONARY:  Lungs are clear to auscultation bilaterally, no crackles, wheezes or rhonchi.  Breathing is nonlabored.   GASTROINTESTINAL:  Abdomen is soft, nontender, nondistended with normoactive bowel sounds.   MUSCULOSKELETAL:  The patient moves all 4 extremities equally.   NEUROLOGIC:  Alert and oriented.  Cranial nerves II-XII are grossly intact.  Motor function is intact without any focal deficits.   SKIN:  Warm, dry, nondiaphoretic.  No rashes seen on limited exam.   PSYCHIATRIC:  Normal mood and affect.      LABORATORY DATA:  Troponin I 0.255., 0.281.  ProBNP is 297.  CMP with albumin 3.2, total protein 6.7, otherwise within normal limits, creatinine 0.72 with potassium 3.6.  VBG:  pH 7.39, pCO2 of 51, pO2 31, bicarbonate 31.  CBC:  WBC 13.7, hemoglobin 14.1, hematocrit 41.7, platelet count 201,000, absolute neutrophil count 10.7.      EKG:  Normal sinus rhythm with T-wave inversions in lateral leads.      IMAGING:  CT chest with contrast:  No evidence of pulmonary embolism.  Thoracic aorta is unremarkable.  Residual inflammatory changes in the lung bases, improved since prior CT.  Reactive  adenopathy in the mediastinum and hilar regions is stable if not improved.  Stable left adrenal nodularity and stable liver systems recent chest CT.  Reading per Radiology.      ASSESSMENT AND PLAN:  Karen Guevara was a pleasant 66-year-old female with a past medical history of hyperlipidemia, asthma, hypothyroidism and recent community-acquired pneumonia who presented to [facility] Emergency Department due to worsening dyspnea on exertion and hypoxia.  She was found to have elevated troponin and EKG changes concerning for non ST elevation myocardial infarction and thus was transferred to [facility] for further evaluation.     1.  Non-ST elevation myocardial infarction:  The patient presents with several weeks of dyspnea on exertion and exertional chest pressure.  She has a documented history of hyperlipidemia and is noted to be hypertensive here, although previously has no treatment for this.  She is a nonsmoker.  No diabetes.  Father had heart disease and  in his 60s.  Her vital signs are stable other than some documented hypoxia at the clinic.  Currently, she is breathing comfortably.  Her initial troponin is elevated at 0.255 with repeat of 0.281.  Her EKG shows T-wave inversions in the lateral leads.  She has been started on IV heparin, which will continue.  She has been given a full dose aspirin and will continue with aspirin 81 mg daily starting tomorrow.  We will trend serial troponins.  We will get an echocardiogram.  Cardiology will be consulted for consideration of coronary angiogram tomorrow.  We will start her on a beta blocker, metoprolol 25 mg b.i.d. ACE inhibitor with lisinopril 5 mg daily and a statin, Zocor 20 mg tonight.  We will have p.r.n. hydralazine for systolic blood pressure greater than 180.  We will get a fasting lipid panel tomorrow.     2.  Recent hospitalization for community-acquired pneumonia and asthma exacerbation.  The patient was at [facility]  from 02/06-02/08/2017, for treatment of the above.  She received IV ceftriaxone and was discharged on azithromycin and a prednisone taper.  She did improve from this; however, had the persistent shortness of breath which brought her in today.  Her lungs are clear on examination.  Her CT chest does show some residual inflammatory changes in lung bases which appear improved since prior CT.  There is no evidence for PE.  She had some hypoxia at the clinic with exertion, although has normal oxygen levels here off supplemental O2.  We will monitor with continuous pulse oximetry.  Continue with p.r.n. albuterol.  Of note, the patient did have recent pulmonary function tests that showed a reduced DLCO.  She is a nonsmoker so chronic obstructive pulmonary disease would be less likely.  Unclear if this is related to recent pneumonia versus some interstitial lung disease.  The patient can follow up with her primary care provider or Pulmonology for this.     3.  Hypothyroidism with history of partial left thyroidectomy as well as a thyroid nodule.  Her nodules considered to be benign.  There was a 3.5 cm mass in the right lobe of the thyroid gland was detected on chest CT in 02/06/2017.  The patient stated that has been there for over a year, but does think it is changing.  Ultrasound confirmed the mass.  It was recommended FNA as a next step, the patient simply wanted to monitor this with a TSH.  We will defer monitoring to primary care at this time.  Her recent TSH was normal.     4.  Systolic cardiac murmur.  This was noted on her previous hospitalization.  It is a 2/6 systolic heard greatest at the apex.  We will obtain an echocardiogram.     5.  Leukocytosis.  This is likely reactive.  The patient has been off steroids for several days.  She has no signs of recurrent pneumonia nor any other localizing infectious symptoms.  We will repeat CBC in the morning.     6.  Deep venous thrombosis prophylaxis:  Heparin drip.       CODE STATUS:  Full code.      DISPOSITION:  Inpatient status for NSTEMI.      This [facility].  He is in agreement with my assessment and plan of care and has independently evaluated the patient.         JAYLENE ROSS MD       As dictated by SRAVANTHI AQUINO PA-C            D: 2017 21:50   T: 2017 22:28   MT: ARUN      Name:     SAVANNAH PAREDES   MRN:      [id number]        Account:      [id number]   :      1950           Admitted:     [id number]      Document: [id number]       cc: Lavern Jefferson DO

## 2017-02-24 NOTE — H&P
Physician Attestation   I, Chris Higgins, saw and evaluated Karen Guevara as part of a shared visit.  I have reviewed and discussed with the advanced practice provider their history, physical and plan.    I personally reviewed the vital signs, medications and labs. CT negative for PE.    My key history or physical exam findings: lungs clear bilaterally, no wheezing, regular heart rate and rhythm, systolic murmur at apex radiating to axilla, no leg edema    Key management decisions made by me:   -discussed troponin elevation and concern for MI and needing to take her medications and she agrees  -serial trops  -ECHO  -cardiology consult for angiogram  -heparin, aspirin, metoprolol, lisinopril and stat    Chris Higgins  Date of Service (when I saw the patient): 02/23/17

## 2017-02-24 NOTE — CONSULTS
Cardiology dictated    The patient's history is much more suggestive of a pulmonary etiology. The troponin pattern is not suggestive of plaque rupture and her TTE is normal. I would suggest noninvasive testing as the best first option. If nuclear stress test shows significant abnormality, then I would proceed to angiogram. If not I would advise focus on pulmonary status.    Plan  Nuclear stress test, either walking or regadenoson    [place]

## 2017-02-24 NOTE — DISCHARGE SUMMARY
[facility]  Discharge Summary        Karen Guevara MRN# [id number]   YOB: 1950 Age: 66 year old     Date of Admission:  2/23/2017  Date of Discharge:  2/24/2017  Admitting Physician:  Chris Higgins MD  Discharge Physician: Lelia Andersen MD  Discharging Service: Hospitalist     Primary Provider: Lavern Del Toro  Primary Care Physician Phone Number: [phone]         Discharge Diagnoses:      Chest pain, atypical        Problem Oriented Hospital Course (Providers):    Karen Guevara was admitted on 2/23/2017 by Chris Higgins MD and I would refer you to their history and physical.  The following problems were addressed during her hospitalization:  Positive troponin, exertional dyspnea with atypical chest pain:  Suspected Non-ST elevation myocardial infarction. Placed on heparin drip and followed with serial troponins. Cardiology consulted and recommend Nuclear stress test. Also felt symptoms likely more respiratory than cardiac. ACS was ruled out with negative nuclear stress test. Will discontinue BB, statin and ACEI and continue asa for prophylaxis.   Upon admission: initial troponin was elevated at 0.255 with repeat of 0.281.   ----EKG shows T-wave inversions in the lateral leads.   ----started on IV heparin.  ----She was ASA, BB, ACEI and statin for possible ACS.   ---- echocardiogram done and showed normal LVF with EF of 60-65% without WMA. Pulmonary hypertension RVSP 59 mmhg  -----fasting lipid panel: cholesterol 201, HDL 84,        Dyspnea with exertion: Likely pulmonary. Recent hospitalization for community-acquired pneumonia and asthma exacerbation and completed course of treatment. Her CT chest does show some residual inflammatory changes in lung bases which appear improved since prior CT. There is no evidence for PE. Recent pulmonary function tests that showed a reduced DLCO. Non smoker.  ---Continue with p.r.n. albuterol.   --- Follow up with  PCP and pulmonology in out patient clinic     Hypothyroidism with history of partial left thyroidectomy as well as a thyroid nodule. Her nodules considered to be benign. There was a 3.5 cm mass in the right lobe of the thyroid gland was detected on chest CT in 02/06/2017. The patient stated that has been there for over a year, but does think it is changing. Ultrasound confirmed the mass. It was recommended FNA as a next step, the patient simply wanted to monitor this with a TSH. We will defer monitoring to primary care at this time. Her recent TSH was normal.       Systolic cardiac murmur. This was noted on her previous hospitalization. It is a 2/6 systolic heard greatest at the apex.  --- echocardiogram done and showed no valvular pathology      Patient seen and assessed. She was found to be stable prior to discharge.             Code Status:      Full Code        Brief Hospital Stay Summary Sent Home With Patient in AVS:               Important Results:      See below.        Pending Results:        Unresulted Labs Ordered in the Past 30 Days of this Admission     No orders found for last 61 day(s).            Discharge Instructions and Follow-Up:            Discharge Disposition:      Discharged to home        Discharge Medications:        Current Discharge Medication List      CONTINUE these medications which have NOT CHANGED    Details   KRILL OIL PO       Turmeric POWD       albuterol (2.5 MG/3ML) 0.083% neb solution Take 1 vial (2.5 mg) by nebulization every 4 hours as needed for shortness of breath / dyspnea or wheezing  Qty: 1 Box, Refills: 11      triamcinolone (KENALOG) 0.1 % cream Apply sparingly to affected area three times daily for 14 days.  Qty: 15 g, Refills: 0    Associated Diagnoses: Rash of hands      albuterol (VENTOLIN HFA) 108 (90 BASE) MCG/ACT Inhaler Inhale 2 puffs into the lungs every 4 hours as needed for shortness of breath / dyspnea or wheezing  Qty: 1 Inhaler, Refills: 11    Associated  "Diagnoses: Mild persistent asthma with exacerbation      VITAMIN A PO Take 1 capsule by mouth daily Reported on 2/23/2017      [facility] (DAILY HERBS IMMUNE DEFENSE PO) Take 2 capsules by mouth every evening Reported on 2/15/2017      vitamin B complex with vitamin C (VITAMIN  B COMPLEX) TABS tablet Take 1 tablet by mouth daily Reported on 2/15/2017      fexofenadine (ALLEGRA) 180 MG tablet Take 1 tablet (180 mg) by mouth daily  Qty: 30 tablet, Refills: 1      fluticasone (FLONASE) 50 MCG/ACT nasal spray Spray 1 spray into both nostrils 2 times daily   Qty: 1 Bottle, Refills: 11      VITAMIN D 1000 UNIT OR TABS Reported on 2/15/2017      order for DME Equipment being ordered: Oxygen  Qty: 1 each, Refills: 11    Associated Diagnoses: Acute respiratory failure with hypoxia (H)      CHOLEST OFF OR take one tablet by mouth when eating a meal that is fatty               Allergies:         Allergies   Allergen Reactions     Amoxicillin Nausea and Vomiting     Claritin [Loratadine] Hives           Consultations This Hospital Stay:      Consultation during this admission received from cardiology        Condition and Physical on Discharge:      Discharge condition: Stable   Vitals: Heart Rate: 87, Blood pressure 101/53, temperature 97.4  F (36.3  C), temperature source Oral, resp. rate 18, height 1.499 m (4' 11\"), weight 55.4 kg (122 lb 2.2 oz), SpO2 96 %.     Constitutional: Awake, alert. No apparent distress   Lungs: Clear to auscultation bilaterally, no crackles or wheezing   Cardiovascular: Regular HR, normal S1 and S2, and no murmur noted   Abdomen: Normal bowel sounds, soft, non-distended, non-tender   Skin: Extensive areas of hyperpigmentation.    Other: LE: no edema                  Discharge Time:      Greater than 30 minutes.        Image Results From This Hospital Stay (For Non-EPIC Providers):        Results for orders placed or performed during the hospital encounter of 02/23/17   NM Lexiscan stress " test (nuc card)    Narrative    GATED MYOCARDIAL PERFUSION SCINTIGRAPHY WITH INTRAVENOUS PHARMACOLOGIC  VASODILATATION LEXISCAN -ONE DAY STUDY     2/24/2017 2:00 PM  SAVANNAH PAREDES  66 years  Female  1950.    Indication/Clinical History: 66-year-old female with no cardiac  history undergoing stress test for dyspnea and mild troponin  elevation.    Impression  1.  Myocardial perfusion imaging using single isotope technique  demonstrated normal perfusion, no ischemia or infarct.   2. Gated images demonstrated normal wall motion.  The left ventricular  systolic function is greater than 75%.  3. No previous study for comparison    Procedure  Pharmacologic stress testing was performed with Lexiscan at a rate of  0.08 mg/ml rapid bolus injection, for 15 seconds, 0.4 mg/5ml  intravenously. Low-level exercise was performed along with the  vasodilator infusion.  The heart rate was 79 at baseline and carlo to  97 beats per minute during the Lexiscan infusion. The rest blood  pressure was 132/70 mmHg and was 138/78 mm Hg during Lexiscan  infusion. The patient experienced shortness of breath  during the  test.    Myocardial perfusion imaging was performed at rest, approximately 45  minutes after the injection intravenously of 8.8 mCi of Tc-99m  Myoview. At peak pharmacologic effect, 10-20 seconds after Lexiscan,   the patient was injected intravenously with 27.0 mCi of  Tc-99m  Myoview. The post-stress tomographic imaging was performed  approximately 60 minutes after stress.    EKG Findings  The resting EKG demonstrated normal sinus rhythm, no baseline ST  segment abnormalities. The stress EKG demonstrated slight downsloping  of ST segments, but no ST depression.    Tomographic Findings  Overall, the study quality is excellent . On the stress images, normal  perfusion. On the rest images, normal perfusion . Gated images  demonstrated normal wall motion. The left ventricular ejection  fraction was calculated to be greater than  75%. TID was was not  appreciated.    TEREZA WAGGONER MD           Most Recent Lab Results In EPIC (For Non-EPIC Providers):    Most Recent 3 CBC's:  Recent Labs   Lab Test  02/23/17   1315  02/07/17   0809  02/06/17   1337   WBC  13.7*  8.0  9.1   HGB  14.1  13.9  14.2   MCV  100  97  97   PLT  201  152  153      Most Recent 3 BMP's:  Recent Labs   Lab Test  02/23/17   1315  02/07/17   0809  02/06/17   1337   NA  144  141  138   POTASSIUM  3.6  3.7  3.8   CHLORIDE  106  107  103   CO2  29  27  29   BUN  12  12  14   CR  0.72  0.73  0.86   ANIONGAP  9  7  6   JOSH  8.6  8.1*  8.1*   GLC  97  81  106*     Most Recent 3 Troponin's:  Recent Labs   Lab Test  02/24/17   0050  02/23/17   2116  02/23/17   1621   TROPI  0.248*  0.264*  0.281*     Most Recent 3 INR's:No lab results found.  Most Recent 2 LFT's:  Recent Labs   Lab Test  02/23/17   1315  02/07/17   0809   AST  35  52*   ALT  42  35   ALKPHOS  65  58   BILITOTAL  0.8  0.6     Most Recent Cholesterol Panel:  Recent Labs   Lab Test  02/24/17   0505   CHOL  201*   LDL  103*   HDL  84   TRIG  70     Most Recent 6 Bacteria Isolates From Any Culture (See EPIC Reports for Culture Details):No lab results found.  Most Recent TSH, T4 and HgbA1c:   Recent Labs   Lab Test  02/07/17   0809   TSH  0.92            Clemente Andersen MD  Internal medicine Hospitalist:   Pager [phone]    2/24/2017

## 2017-02-24 NOTE — UTILIZATION REVIEW
"  Admission Status; Secondary Review Determination         Under the authority of the Utilization Management Committee, the utilization review process indicated a secondary review on the above patient.  The review outcome is based on review of the medical records, discussions with staff, and applying clinical experience noted on the date of the review.          (x) Observation Status Appropriate - This patient does not meet hospital inpatient criteria and is placed in observation status. If this patient's primary payer is Medicare and was admitted as an inpatient, Condition Code 44 should be used and patient status changed to \"observation\".     RATIONALE FOR DETERMINATION   67 yo woman came in with chest pain and increased coughing. Trop high .26. Cardiology saw and NM Lexiscan stress test was completed and normal. she is being discharged after 1 night stay.The severity of illness, intensity of service provided, expected LOS and risk for adverse outcome make the care appropriate for further observation; however, doesn't meet criteria for hospital inpatient admission. Dr Osullivan notified of this determination.    This document was produced using voice recognition software.      The information on this document is developed by the utilization review team in order for the business office to ensure compliance.  This only denotes the appropriateness of proper admission status and does not reflect the quality of care rendered.         The definitions of Inpatient Status and Observation Status used in making the determination above are those provided in the CMS Coverage Manual, Chapter 1 and Chapter 6, section 70.4.      Sincerely,     ERYN VERA MD    System Medical Director  Utilization Management  [facility].      "

## 2017-02-27 ENCOUNTER — CARE COORDINATION (OUTPATIENT)
Dept: CARE COORDINATION | Facility: CLINIC | Age: 67
End: 2017-02-27

## 2017-02-27 NOTE — PROGRESS NOTES
Clinic Care Coordination Contact  Care Team Conversations    Clinical Data: RN CC called to f/u with pt. After last week's office visit, pt was sent to the ED. She was tend transferred to [facility] for cardiology work up (please see hospital encounter). Pt was instructed to f/u with PCP. Apt was made for tomorrow (due to limited availability). Pt's oxygen sats were normal while in the hospital, so she was not sent home with oxygen.   Last week, PCP requested assistance to get pt set up with pulmonary due to hypoxia and abnormal CT scans. [facility] not a [facility] to [facility] Alternative locations like [place] were discussed. Pt would like to see if she could get in to see pulmonology at [place] if possible. If not, she would consider looking at different locations.     New medications to review:  Started on ASA - no concerns or questions    Clinical Pathway Documentation: NA    Goals/Progress (add to goals section): not addressed    Resources Given: none needed at this time    Plan:   1. Pt to see PCP tomorrow.   2. RN CC will look into pulmonary locations and f/u with pt and PCP.     Alka Green, RN-BSN  RN Clinic Care Coordinator  [facility]  [phone]

## 2017-02-28 ENCOUNTER — OFFICE VISIT (OUTPATIENT)
Dept: FAMILY MEDICINE | Facility: CLINIC | Age: 67
End: 2017-02-28
Payer: COMMERCIAL

## 2017-02-28 VITALS
WEIGHT: 123 LBS | HEIGHT: 59 IN | OXYGEN SATURATION: 100 % | SYSTOLIC BLOOD PRESSURE: 132 MMHG | DIASTOLIC BLOOD PRESSURE: 83 MMHG | HEART RATE: 87 BPM | TEMPERATURE: 98.6 F | BODY MASS INDEX: 24.8 KG/M2

## 2017-02-28 DIAGNOSIS — J18.9 COMMUNITY ACQUIRED PNEUMONIA: ICD-10-CM

## 2017-02-28 DIAGNOSIS — I73.00 RAYNAUD'S PHENOMENON WITHOUT GANGRENE: ICD-10-CM

## 2017-02-28 DIAGNOSIS — J45.30 MILD PERSISTENT ASTHMA WITHOUT COMPLICATION: ICD-10-CM

## 2017-02-28 DIAGNOSIS — R91.8 ABNORMAL CT SCAN OF LUNG: ICD-10-CM

## 2017-02-28 DIAGNOSIS — R79.89 ELEVATED TROPONIN: Primary | ICD-10-CM

## 2017-02-28 DIAGNOSIS — E04.1 THYROID NODULE: ICD-10-CM

## 2017-02-28 PROBLEM — I24.9 ACS (ACUTE CORONARY SYNDROME) (H): Status: RESOLVED | Noted: 2017-02-23 | Resolved: 2017-02-28

## 2017-02-28 PROCEDURE — 99495 TRANSJ CARE MGMT MOD F2F 14D: CPT | Performed by: INTERNAL MEDICINE

## 2017-02-28 RX ORDER — FLUTICASONE PROPIONATE 110 UG/1
2 AEROSOL, METERED RESPIRATORY (INHALATION) 2 TIMES DAILY
Qty: 1 INHALER | Refills: 11 | Status: SHIPPED | OUTPATIENT
Start: 2017-02-28

## 2017-02-28 NOTE — PROGRESS NOTES
Clinic Care Coordination Contact  Care Team Conversations    Clinical Data: RN CC was in contact with the [place] location to f/u with pulmonary needs. They are booked out until July 2017. RN CC spoke with the [facility]  pulmonary clinic. Spoke with [facility] for the ILD clinic ([phone]). She request that PCP would order a high resolution, non contrast CT with inspiratory and expiratory cuts - this will give a better picture of pt's lungs to determine if pt has ILD or another source (like pulm HTN). This will determine what clinic pt should be seen at.   PCP updated on this.     Pt came in to see PCP today. See PCP OV notes. RN CC will f/u with pt later this week on recommendations.  RN DANIEL also updated pulmonary CC that pt will have a CT in a month. RN CC will notify her at that time to allow for them to review.     New medications to review:  NA    Clinical Pathway Documentation: NA    Goals/Progress (add to goals section): Not addressed    Resources Given: NA    Plan: RN CC will f/u with pt later this week.     Alka Green RN-BSN  RN Clinic Care Coordinator  [facility]  [phone]

## 2017-02-28 NOTE — PROGRESS NOTES
SUBJECTIVE:                                                    Karen Guevara is a 66 year old female who presents to clinic today for the following health issues:        Hospital Follow-up Visit:    Hospital/Nursing Home/IP Rehab Facility: [facility]  Date of Admission: 2/23/17  Date of Discharge: 02/24/17  Reason(s) for Admission: shortness of breath, elevated troponin        Chief Complaint   Patient presents with     Hospital F/U     [facility] after ER here            Problems taking medications regularly:  None       Medication changes since discharge: None       Problems adhering to non-medication therapy:  None    Summary of hospitalization:  [facility] discharge summary reviewed  Diagnostic Tests/Treatments reviewed.  Follow up needed: Pulmonary  Other Healthcare Providers Involved in Patient s Care:         Care Coordination  Update since discharge: stable.     Post Discharge Medication Reconciliation: discharge medications reconciled and changed, per note/orders (see AVS).  Plan of care communicated with patient     Coding guidelines for this visit:  Type of Medical   Decision Making Face-to-Face Visit       within 7 Days of discharge Face-to-Face Visit        within 14 days of discharge   Moderate Complexity 11339 [id number]   High Complexity 28127 [id number]        In ER was found to have significant troponin elevation.  Was ruled out for acute coronary syndrome with normal stress test.  Was not felt to be candidate for statin, aspirin, ACEi due to non-cardiac cause of troponin elevation.  It was thought pulmonary issue was the  of her symptoms.    It is difficult to get good Pulse Ox on patient due to raynauds.  She has to warm fingers up to get accurate reading.    She drops to 69% on room air, and takes a while for sats to improve with rest.  She reports she wore ear sat probe while inpatient.  She reports that her O2 was normal.      She still feels tired, but overall is  improving.  She is not coughing.  She does feel that breathing is normal.  No fevers or chills.  The albuterol makes her jittery and anxious.    She doesn't want to use Flovent due to cost.  singulair gives her 'hunger pains' and did not help with her asthma much.      Current Outpatient Prescriptions   Medication Sig Dispense Refill     aspirin 81 MG chewable tablet Take 1 tablet (81 mg) by mouth daily 36 tablet 0     KRILL OIL PO        Turmeric POWD        albuterol (2.5 MG/3ML) 0.083% neb solution Take 1 vial (2.5 mg) by nebulization every 4 hours as needed for shortness of breath / dyspnea or wheezing 1 Box 11     albuterol (VENTOLIN HFA) 108 (90 BASE) MCG/ACT Inhaler Inhale 2 puffs into the lungs every 4 hours as needed for shortness of breath / dyspnea or wheezing 1 Inhaler 11     VITAMIN A PO Take 1 capsule by mouth daily Reported on 2/23/2017       vitamin B complex with vitamin C (VITAMIN  B COMPLEX) TABS tablet Take 1 tablet by mouth daily Reported on 2/15/2017       fexofenadine (ALLEGRA) 180 MG tablet Take 1 tablet (180 mg) by mouth daily 30 tablet 1     fluticasone (FLONASE) 50 MCG/ACT nasal spray Spray 1 spray into both nostrils 2 times daily  1 Bottle 11     VITAMIN D 1000 UNIT OR TABS Reported on 2/15/2017       order for DME Equipment being ordered: Oxygen 1 each 11     triamcinolone (KENALOG) 0.1 % cream Apply sparingly to affected area three times daily for 14 days. 15 g 0     Misc Natural Products (DAILY HERBS IMMUNE DEFENSE PO) Take 2 capsules by mouth every evening Reported on 2/28/2017       CHOLEST OFF OR Reported on 2/28/2017       Reviewed and updated as needed this visit by clinical staff       Reviewed and updated as needed this visit by Provider       ROS:  Constitutional, HEENT, cardiovascular, pulmonary, GI, , musculoskeletal, neuro, skin, endocrine and psych systems are negative, except as otherwise noted.    OBJECTIVE:                                                    /83  "(BP Location: Right arm, Patient Position: Chair, Cuff Size: Adult Small)  Pulse 87  Temp 98.6  F (37  C) (Tympanic)  Ht 4' 11\" (1.499 m)  Wt 123 lb (55.8 kg)  SpO2 100%  BMI 24.84 kg/m2  Body mass index is 24.84 kg/(m^2).  GENERAL APPEARANCE: healthy, alert, no distress and appears much less ill from previous visit.  RESP: lungs clear to auscultation - no rales, rhonchi or wheezes  CV: regular rates and rhythm, normal S1 S2, no S3 or S4, no murmur, click or rub and systolic flow murmur  LYMPHATICS: no leg edema  SKIN: cyanotic cold fingers.  Improved temp and color with wearing gloves.       ASSESSMENT/PLAN:                                                        ICD-10-CM    1. Elevated troponin R79.89    2. Abnormal CT scan of lung R91.8    3. Mild persistent asthma without complication J45.30 fluticasone (FLOVENT HFA) 110 MCG/ACT Inhaler   4. Community acquired pneumonia J18.9    5. Raynaud's phenomenon without gangrene I73.00    6. Thyroid nodule E04.1 US Biopsy Thyroid Fine Needle Aspiration     It may be that the hypoxia she was found to have was artificial due to Raynauds.  When we checked her O2 sat with finger pulse ox, it went as low as 69% today.  When measured with earlobe probe, it went to 88% with exertion but quickly improved within a few sec to > 92% with rest.  Her CT shows improved interstitial opacities, which could be due to pneumonia.  Will repeat this in 1 month, if opacities persist, then she should see Pulm.    Trop elevation with negative stress test.  Patient does NOT have IVD, and does NOT require treatment with statin, asa, etc.  However, this is out of proportion to just resolving CAP and mild asthma flare up, hence the more aggressive w/u with repeat CT.    Thyroid nodule - needs biopsy.  Patient agreeable.    1. Order placed for CT of chest.  Do this in about 1 month.  If the lung abnormalities persist, then we will have you see a lung doctor  2. Ok to use albuterol just as needed " since it seems to cause jitteriness.  3. You will need biopsy of thyroid nodule soon.  Please call [phone] to schedule.  4. Start Flovent.  We can substitute for a different medication if you find a cheaper one.    1. Can contact Carlita Coffey at [phone] for prescription drug cost assistance.  2. Search engine for cost-savings:  www.[employer].org   3. List of websites for [place] pharmacies:    [email or web address]       Lavern Jefferson DO  [facility]

## 2017-02-28 NOTE — PATIENT INSTRUCTIONS
Thank you for choosing [facility].  You may be receiving a survey in the mail from Eliana De La Torre regarding your visit today.  Please take a few minutes to complete and return the survey to let us know how we are doing.      If you have questions or concerns, please contact us via [employer] or you can contact your care team at [phone].    Our Clinic hours are:  Monday 6:40 am  to 7:00 pm  Tuesday -Friday 6:40 am to 5:00 pm    The [place] outpatient lab hours are:  Monday - Friday 6:10 am to 4:45 pm  Saturdays 7:00 am to 11:00 am  Appointments are required, call [phone]    If you have clinical questions after hours or would like to schedule an appointment,  call the clinic at [phone].      [facility] [address]      1. Order placed for CT of chest.  Do this in about 1 month.  If the lung abnormalities persist, then we will have you see a lung doctor  2. Ok to use albuterol just as needed since it seems to cause jitteriness.  3. You will need biopsy of thyroid nodule soon.  Please call [phone] to schedule.  4. Start Flovent.  We can substitute for a different medication if you find a cheaper one.    1. Can contact Carlita Coffey at [phone] for prescription drug cost assistance.  2. Search engine for cost-savings:  www.needymeds.org   3. List of websites for [place] pharmacies:    [email or web address]

## 2017-02-28 NOTE — NURSING NOTE
"Chief Complaint   Patient presents with     Hospital F/U     [facility] after ER here       Initial /83 (BP Location: Right arm, Patient Position: Chair, Cuff Size: Adult Small)  Pulse 87  Temp 98.6  F (37  C) (Tympanic)  Ht 4' 11\" (1.499 m)  Wt 123 lb (55.8 kg)  SpO2 90%  BMI 24.84 kg/m2 Estimated body mass index is 24.84 kg/(m^2) as calculated from the following:    Height as of this encounter: 4' 11\" (1.499 m).    Weight as of this encounter: 123 lb (55.8 kg).  Medication Reconciliation: complete     Oxygen low at 80 walked for very short distance and was between 75-86.  Once sat down didn't recover to 89 or greater after sitting several minutes. 4 minutes finally recovered and up to 98 %.  Then walked patient with ear monitor and was at 100 walked over 40 ft went down to 90 rested 1 minute and back to 100 walked rest of the way stay at  %.dp  "

## 2017-02-28 NOTE — MR AVS SNAPSHOT
After Visit Summary   2/28/2017    Karen Guevara    MRN: [id number]           Patient Information     Date Of Birth          1950        Visit Information        Provider Department      2/28/2017 1:40 PM Lavern Jefferson, DO [facility]        Today's Diagnoses     Elevated troponin    -  1    Abnormal CT scan of lung        Thyroid nodule        Mild persistent asthma without complication          Care Instructions          Thank you for choosing [facility].  You may be receiving a survey in the mail from [facility] regarding your visit today.  Please take a few minutes to complete and return the survey to let us know how we are doing.      If you have questions or concerns, please contact us via [employer] or you can contact your care team at [phone].    Our Clinic hours are:  Monday 6:40 am  to 7:00 pm  Tuesday -Friday 6:40 am to 5:00 pm    The [place] outpatient lab hours are:  Monday - Friday 6:10 am to 4:45 pm  Saturdays 7:00 am to 11:00 am  Appointments are required, call [phone]    If you have clinical questions after hours or would like to schedule an appointment,  call the clinic at [phone].      [address]      1. Order placed for CT of chest.  Do this in about 1 month.  If the lung abnormalities persist, then we will have you see a lung doctor  2. Ok to use albuterol just as needed since it seems to cause jitteriness.  3. You will need biopsy of thyroid nodule soon.  Please call [phone] to schedule.  4. Start Flovent.  We can substitute for a different medication if you find a cheaper one.    1. Can contact Carlita Coffey at [phone] for prescription drug cost assistance.  2. Search engine for cost-savings:  www.needymeds.org   3. List of websites for [place] pharmacies:    [email or web address]           Follow-ups after your visit        Future tests that  "were ordered for you today     Open Future Orders        Priority Expected Expires Ordered    US Biopsy Thyroid Fine Needle Aspiration Routine  2018    CT Chest w Contrast Routine  2018            Who to contact     If you have questions or need follow up information about today's clinic visit or your schedule please contact [facility] directly at [phone].  Normal or non-critical lab and imaging results will be communicated to you by [employer], letter or phone within 4 business days after the clinic has received the results. If you do not hear from us within 7 days, please contact the clinic through [employer] or phone. If you have a critical or abnormal lab result, we will notify you by phone as soon as possible.  Submit refill requests through [employer] or call your pharmacy and they will forward the refill request to us. Please allow 3 business days for your refill to be completed.          Additional Information About Your Visit        [employer] Information     myRete lets you send messages to your doctor, view your test results, renew your prescriptions, schedule appointments and more. To sign up, go to [email or web address] . Click on \"Log in\" on the left side of the screen, which will take you to the Welcome page. Then click on \"Sign up Now\" on the right side of the page.     You will be asked to enter the access code listed below, as well as some personal information. Please follow the directions to create your username and password.     Your access code is: [email or web address]  Expires: 2017 12:06 PM     Your access code will  in 90 days. If you need help or a new code, please call your [place] clinic or [phone].        Care EveryWhere ID     This is your Care EveryWhere ID. This could be used by other organizations to access your [place] medical records  [id number]        Your Vitals Were     Pulse Temperature Height Pulse Oximetry BMI (Body Mass " "Index)       87 98.6  F (37  C) (Tympanic) 4' 11\" (1.499 m) 90% 24.84 kg/m2        Blood Pressure from Last 3 Encounters:   02/28/17 132/83   02/24/17 101/53   02/23/17 140/66    Weight from Last 3 Encounters:   02/28/17 123 lb (55.8 kg)   02/24/17 122 lb 2.2 oz (55.4 kg)   02/23/17 123 lb (55.8 kg)                 Today's Medication Changes          These changes are accurate as of: 2/28/17  2:35 PM.  If you have any questions, ask your nurse or doctor.               Start taking these medicines.        Dose/Directions    fluticasone 110 MCG/ACT Inhaler   Commonly known as:  FLOVENT HFA   Used for:  Mild persistent asthma without complication   Started by:  Lavern Jefferson DO        Dose:  2 puff   Inhale 2 puffs into the lungs 2 times daily   Quantity:  1 Inhaler   Refills:  11            Where to get your medicines      These medications were sent to [facility] [place] [id number]     Phone:  [phone]     fluticasone 110 MCG/ACT Inhaler                Primary Care Provider Office Phone # Fax #    Lavern Jefferson [phone]       [address] [address]        Thank you!     Thank you for choosing [facility]  for your care. Our goal is always to provide you with excellent care. Hearing back from our patients is one way we can continue to improve our services. Please take a few minutes to complete the written survey that you may receive in the mail after your visit with us. Thank you!             Your Updated Medication List - Protect others around you: Learn how to safely use, store and throw away your medicines at www.disposemymeds.org.          This list is accurate as of: 2/28/17  2:35 PM.  Always use your most recent med list.                   Brand Name Dispense Instructions for use    * albuterol 108 (90 BASE) MCG/ACT Inhaler    VENTOLIN HFA    1 Inhaler    " Inhale 2 puffs into the lungs every 4 hours as needed for shortness of breath / dyspnea or wheezing       * albuterol (2.5 MG/3ML) 0.083% neb solution     1 Box    Take 1 vial (2.5 mg) by nebulization every 4 hours as needed for shortness of breath / dyspnea or wheezing       aspirin 81 MG chewable tablet     36 tablet    Take 1 tablet (81 mg) by mouth daily       cholecalciferol 1000 UNIT tablet    vitamin D     Reported on 2/15/2017       CHOLEST OFF PO      Reported on 2/28/2017       DAILY HERBS IMMUNE DEFENSE PO      Take 2 capsules by mouth every evening Reported on 2/28/2017       fexofenadine 180 MG tablet    ALLEGRA    30 tablet    Take 1 tablet (180 mg) by mouth daily       fluticasone 110 MCG/ACT Inhaler    FLOVENT HFA    1 Inhaler    Inhale 2 puffs into the lungs 2 times daily       fluticasone 50 MCG/ACT spray    FLONASE    1 Bottle    Spray 1 spray into both nostrils 2 times daily       KRILL OIL PO          order for DME     1 each    Equipment being ordered: Oxygen       triamcinolone 0.1 % cream    KENALOG    15 g    Apply sparingly to affected area three times daily for 14 days.       Turmeric Powd          VITAMIN A PO      Take 1 capsule by mouth daily Reported on 2/23/2017       vitamin B complex with vitamin C Tabs tablet      Take 1 tablet by mouth daily Reported on 2/15/2017       * Notice:  This list has 2 medication(s) that are the same as other medications prescribed for you. Read the directions carefully, and ask your doctor or other care provider to review them with you.

## 2017-03-01 NOTE — PROGRESS NOTES
Clinic Care Coordination Contact  [facility]/Voicemail    Referral Source: PCP  Clinical Data: Care Coordinator Outreach  Outreach attempted x 1 - f/u with recent office visit and need for f/u CT.  Left message on voicemail with call back information and requested return call.  Plan: Care Coordinator will f/u with pt in 2-3 weeks.   Alka Green, RN-BSN  RN Clinic Care Coordinator  [facility]  [phone]

## 2017-03-02 LAB
DLCOCOR-%PRED-PRE: 35 %
DLCOCOR-PRE: 6.75 ML/MIN/MMHG
DLCOUNC-%PRED-PRE: 36 %
DLCOUNC-PRE: 6.85 ML/MIN/MMHG
DLCOUNC-PRED: 18.95 ML/MIN/MMHG
ERV-%PRED-PRE: 78 %
ERV-PRE: 0.5 L
ERV-PRED: 0.64 L
EXPTIME-PRE: 6.09 SEC
FEF2575-%PRED-POST: 127 %
FEF2575-%PRED-PRE: 99 %
FEF2575-POST: 2.16 L/SEC
FEF2575-PRE: 1.7 L/SEC
FEF2575-PRED: 1.7 L/SEC
FEFMAX-%PRED-PRE: 129 %
FEFMAX-PRE: 6.7 L/SEC
FEFMAX-PRED: 5.18 L/SEC
FEV1-%PRED-PRE: 90 %
FEV1-PRE: 1.65 L
FEV1FEV6-PRE: 81 %
FEV1FEV6-PRED: 80 %
FEV1FVC-PRE: 82 %
FEV1FVC-PRED: 80 %
FEV1SVC-PRE: 82 %
FEV1SVC-PRED: 72 %
FIFMAX-PRE: 4.08 L/SEC
FRCPLETH-%PRED-PRE: 87 %
FRCPLETH-PRE: 2.14 L
FRCPLETH-PRED: 2.44 L
FVC-%PRED-PRE: 88 %
FVC-PRE: 2.01 L
FVC-PRED: 2.28 L
IC-%PRED-PRE: 76 %
IC-PRE: 1.45 L
IC-PRED: 1.89 L
RVPLETH-%PRED-PRE: 87 %
RVPLETH-PRE: 1.57 L
RVPLETH-PRED: 1.79 L
TLCPLETH-%PRED-PRE: 86 %
TLCPLETH-PRE: 3.59 L
TLCPLETH-PRED: 4.14 L
VA-%PRED-PRE: 70 %
VA-PRE: 3.01 L
VC-%PRED-PRE: 79 %
VC-PRE: 2.02 L
VC-PRED: 2.53 L

## 2017-03-02 NOTE — PROGRESS NOTES
I got this PFT result for Karen that showed reduced DLCO consistent with a pulmonary vascular problem, other measurements normal.  I see she recently had a chest CTA that did not find any PE.  I know you just recently saw her in clinic and were thinking about send her to pulm in a month if her repeat CT is not improved- given this PFT result would you want to send her earlier?  Thanks for your input.

## 2017-03-05 ENCOUNTER — TELEPHONE (OUTPATIENT)
Dept: FAMILY MEDICINE | Facility: CLINIC | Age: 67
End: 2017-03-05

## 2017-03-05 DIAGNOSIS — R94.2 ABNORMAL PFT: ICD-10-CM

## 2017-03-05 DIAGNOSIS — J96.01 ACUTE RESPIRATORY FAILURE WITH HYPOXIA (H): Primary | ICD-10-CM

## 2017-03-05 NOTE — TELEPHONE ENCOUNTER
Patient's PFT testing was read by Dr. Suárez who contacted me directly.  She has significant lung abnormality, not explained by pneumonia.  I will place referral to a special cardiologist that deals in pulmonary hypertension, as I think that is who she needs to see.

## 2017-03-08 ENCOUNTER — CARE COORDINATION (OUTPATIENT)
Dept: PULMONOLOGY | Facility: CLINIC | Age: 67
End: 2017-03-08

## 2017-03-08 NOTE — PROGRESS NOTES
Received referral from PCP. Left multiple messages for patient. Spoke with patient today. She is not ready to come see Pulmonary at this time. She will plan to follow up with there PCP in the next month and repeat a CT scan. Also will plan to get thyroid biopsy done. Gave patient our phone number and told to contact if she changes her mind. Patient agreed with plan.

## 2017-03-28 ENCOUNTER — TELEPHONE (OUTPATIENT)
Dept: FAMILY MEDICINE | Facility: CLINIC | Age: 67
End: 2017-03-28

## 2017-03-28 ENCOUNTER — CARE COORDINATION (OUTPATIENT)
Dept: CARE COORDINATION | Facility: CLINIC | Age: 67
End: 2017-03-28

## 2017-03-28 NOTE — TELEPHONE ENCOUNTER
"Left message on answering machine for patient to call back.  There is a note on the \"order\" under appt desk that says diagnostics has contacted her and she doesn't want to schedule now.     When she calls, please have an RN speak with her and try to encourage her to do this, per Dr Jefferson's note below. Thanks,   Audrey Shannon RNC    "

## 2017-03-28 NOTE — PROGRESS NOTES
Clinic Care Coordination Contact  [facility]/Voicemail    Referral Source: PCP  Clinical Data: Care Coordinator Outreach  Outreach attempted.  Left message on voicemail with call back information and requested return call.  Plan: Care Coordinator will try to reach patient again in 5-20 business days.  Alka Green, RN-BSN  RN Clinic Care Coordinator  [facility]  [phone]

## 2017-03-28 NOTE — TELEPHONE ENCOUNTER
Spoke with patient.  She has put off scheduling the CT scan and FNA biopsy.  She reports that she is trying to be more natural and holistic in caring for herself.  She believes that all the medications that she is on is making her sick.  She feels the her holistic teas that she has been drinking are working better than the medications.  Encouraged patient to schedule radiology appointments or to make an appointment with PCP to discuss her concerns.  Patient verbalized her understanding and will consider the above choices, but is not interested in either one at this time.    Maria Guadalupe Chaudhary RN

## 2017-03-31 ENCOUNTER — OFFICE VISIT (OUTPATIENT)
Dept: ALLERGY | Facility: CLINIC | Age: 67
End: 2017-03-31
Payer: COMMERCIAL

## 2017-03-31 VITALS
HEIGHT: 59 IN | DIASTOLIC BLOOD PRESSURE: 88 MMHG | WEIGHT: 122.8 LBS | BODY MASS INDEX: 24.76 KG/M2 | SYSTOLIC BLOOD PRESSURE: 149 MMHG | HEART RATE: 87 BPM

## 2017-03-31 DIAGNOSIS — T78.1XXA REACTION TO FOOD, INITIAL ENCOUNTER: ICD-10-CM

## 2017-03-31 DIAGNOSIS — J30.1 SEASONAL ALLERGIC RHINITIS DUE TO POLLEN: ICD-10-CM

## 2017-03-31 DIAGNOSIS — J45.909 UNSPECIFIED ASTHMA, UNCOMPLICATED: ICD-10-CM

## 2017-03-31 DIAGNOSIS — J30.81 NON-SEASONAL ALLERGIC RHINITIS DUE TO ANIMAL HAIR AND DANDER: Primary | ICD-10-CM

## 2017-03-31 LAB
FEF 25/75: NORMAL
FEV-1: NORMAL
FEV1/FVC: NORMAL
FVC: NORMAL

## 2017-03-31 PROCEDURE — 36415 COLL VENOUS BLD VENIPUNCTURE: CPT | Performed by: ALLERGY & IMMUNOLOGY

## 2017-03-31 PROCEDURE — 86003 ALLG SPEC IGE CRUDE XTRC EA: CPT | Performed by: ALLERGY & IMMUNOLOGY

## 2017-03-31 PROCEDURE — 94010 BREATHING CAPACITY TEST: CPT | Performed by: ALLERGY & IMMUNOLOGY

## 2017-03-31 PROCEDURE — 95004 PERQ TESTS W/ALRGNC XTRCS: CPT | Performed by: ALLERGY & IMMUNOLOGY

## 2017-03-31 PROCEDURE — 99204 OFFICE O/P NEW MOD 45 MIN: CPT | Mod: 25 | Performed by: ALLERGY & IMMUNOLOGY

## 2017-03-31 RX ORDER — EPINEPHRINE 0.3 MG/.3ML
0.3 INJECTION SUBCUTANEOUS PRN
Qty: 0.6 ML | Refills: 3 | Status: SHIPPED | OUTPATIENT
Start: 2017-03-31

## 2017-03-31 RX ORDER — AZELASTINE 1 MG/ML
2 SPRAY, METERED NASAL 2 TIMES DAILY PRN
Qty: 1 BOTTLE | Refills: 3 | Status: SHIPPED | OUTPATIENT
Start: 2017-03-31

## 2017-03-31 ASSESSMENT — ENCOUNTER SYMPTOMS
EYE REDNESS: 0
SINUS PRESSURE: 0
MYALGIAS: 0
WHEEZING: 0
SHORTNESS OF BREATH: 1
EYE ITCHING: 0
FEVER: 0
CHILLS: 0
DIARRHEA: 0
COUGH: 1
NAUSEA: 0
ARTHRALGIAS: 0
ACTIVITY CHANGE: 1
CHEST TIGHTNESS: 0
HEADACHES: 0
VOMITING: 0
EYE DISCHARGE: 0
ADENOPATHY: 0
RHINORRHEA: 1
FACIAL SWELLING: 0

## 2017-03-31 NOTE — NURSING NOTE
"Chief Complaint   Patient presents with     Allergy Consult     Ref by Lavern Galvan for asthma.        Initial /88 (BP Location: Left arm, Patient Position: Chair, Cuff Size: Adult Regular)  Pulse 87  Ht 4' 11.06\" (1.5 m)  Wt 122 lb 12.7 oz (55.7 kg)  BMI 24.76 kg/m2 Estimated body mass index is 24.76 kg/(m^2) as calculated from the following:    Height as of this encounter: 4' 11.06\" (1.5 m).    Weight as of this encounter: 122 lb 12.7 oz (55.7 kg).  Medication Reconciliation: complete    "

## 2017-03-31 NOTE — Clinical Note
Dear Dr. Jefferson and Ms. Galvan  The patient was seen in Allergy Clinic for a new patient visit.  Please see the office visit note for more details. Thank you for the referral!!!

## 2017-03-31 NOTE — LETTER
[facility]                   FOOD ALLERGY & ANAPHYLAXIS EMERGENCY CARE PLAN  Food Allergy Research & Education         Name: Karen LR Ismael VELÁSQUEZO.B.:  [id number]    Allergy to: egg  Weight: 122 lbs 12.74 oz lbs.  Asthma:  Yes  (higher risk for a severe reaction)    -NOTE: Do not depend on antihistamines or inhalers (bronchodilators) to treat a severe reaction. USE EPINEPHRINE.     MEDICATIONS/DOSES  Epinephrine Brand: EpiPen  Epinephrine Dose: 0.3 mg IM  Antihistamine Brand or Generic: Zyrtec (Cetirizine) oral sol  Antihistamine Dose: 10 mg  Other (e.g., inhaler-bronchodilator if wheezing): albuterol       FARE                   FOOD ALLERGY & ANAPHYLAXIS EMERGENCY CARE PLAN   Food Allergy Research & Education         OTHER DIRECTIONS/INFORMATION (may self-carry epinephrine,may self-administer epinephrine, etc.):         EMERGENCY CONTACTS - CALL 911  DOCTOR:  Jose Orona MD   PHONE: [phone]  PARENT/GUARDIAN:              PHONE:  OTHER EMERGENCY CONTACTS  NAME/RELATIONSHIP:   PHONE:   NAME/RELATIONSHIP:    PHONE:           PARENT/GUARDIAN AUTHORIZATION SIGNATURE     DATE              PHYSICIAN/H CP AUTHORIZATION SIGNATURE         DATE  FORM PROVIDED COURTESY OF FOOD ALLERGY RESEARCH & EDUCATION (FARE) [email or web address]) 2014

## 2017-03-31 NOTE — MR AVS SNAPSHOT
After Visit Summary   3/31/2017    Karen Guevara    MRN: [id number]           Patient Information     Date Of Birth          1950        Visit Information        Provider Department      3/31/2017 10:40 AM Jose Orona MD [facility]        Today's Diagnoses     Non-seasonal allergic rhinitis due to animal hair and dander    -  1    Seasonal allergic rhinitis due to pollen        Reaction to food, initial encounter        Unspecified asthma, uncomplicated          Care Instructions    -Use azelastine 2 sprays in each nostril twice a day when necessary.  Ketotifen (Zaditor, Alaway) 1 drop/eye twice daily as needed.       AEROALLERGEN AVOIDANCE INSTRUCTIONS  POLLEN  Pollens are the tiny airborne particles given off by trees, weeds, and grasses. They can be the cause of seasonal allergic rhinitis or hay fever symptoms, which include stuffy, itchy, runny nose, redness, swelling and itching of the eyes, and itching of the ears and throat. Here are some tips on how to avoid pollen exposure.  1. .Keep windows closed and use the air conditioner when possible.  2.  Avoid outside exposure in the early morning as pollen counts are highest at that time.  3.  Take a shower and wash hair each night.  4.  Consider wearing a mask when working in the yard and/or garden.  5.  Clean furnace filter monthly with HEPA filters. Consider a HEPA filter vacuum  which will prevent pollen from being reintroduced into the air.   DUST MITES  Dust mites can never be entirely eliminated in the house no matter how clean your house is. Dust mites are attracted to warm, moist areas and feed on dead skin flakes. Here are tips to minimize dust mites in your home.  1.  Encase pillows and mattress/box springs in zippered allergy covers.  2.  Wash bedding in hot water (at least 130 F) every 7-14 days.  3.  Avoid curtains, carpet, and upholstered furniture if possible.  4.  Use HEPA air filters and a HEPA filter  vacuum . Change filters monthly. Vacuum weekly.  5.  Keep bedroom simple, avoiding clutter, so it can quickly be dusted.  6.  Cover heating vents with vent filters.  7.  Keep stuffed toys in a closed container and wash or freeze regularly.  8.  Keep clothing in the closet with the door closed.  PETS  Pets present many problems for people with allergies. Dander from pets is very difficult to remove and also is a food source for dust mites.  1.  If possible, find the pet a new home.  2.  If not possible, keep the pet outdoors. Never allow the pet into the bedroom.  3.  Wash pet weekly in warm water.  4.  Encase mattresses, pillows, and box springs in allergen-proof covers.  5.  Use HEPA air filters and a HEPA filter vacuum . Change filters monthly.      Egg Allergy   Egg allergy is estimated to affect approximately 1.5% of young children. But it s also a food allergy that is one of the most likely to be outgrown over time. Most allergic reactions associated with egg involve the skin, but anaphylaxis also can occur. Allergic reactions to egg are mostly IgE-mediated (involving IgE antibodies).  Baking  For each egg, substitute one of the following in recipes. These substitutes work well when baking from scratch and substituting 1 to 3 eggs.    1 tsp. baking powder, 1 T. liquid, 1 T. vinegar     1 tsp. yeast dissolved in 1/4 cup warm water     1 1/2 T. water, 1 1/2 T. oil, 1 tsp. baking powder     1 packet gelatin, 2 T. warm water. Do not mix until ready to use.  Some Hidden Sources of Egg    Eggs have been used to create the foam or milk topping on specialty coffee drinks and are used in some bar drinks.     Some commercial brands of egg substitutes contain egg whites.     Most commercially processed cooked pastas (including those used in prepared foods such as soup) contain egg or are processed on equipment shared with egg-containing pastas. Boxed, dry pastas are usually egg-free, but may be processed on  equipment that is also used for egg-containing products. Fresh pasta is sometimes egg-free, too. Read the label or ask about ingredients before eating pasta.     Egg wash is sometimes used on pretzels before they are dipped in salt.   Commonly Asked Questions  Is a flu shot safe for an individual with an egg allergy?  Influenza vaccines are grown on egg embryos and may contain a small amount of egg protein. However, egg allergic patients are able to receive the influenza vaccination as long as received in a physician office equipped to treat anaphylaxis.     Can an MMR Vaccine be given to an individual with an egg allergy?  The recommendations of the American Academy of Pediatrics (AAP) acknowledge that the MMR vaccine can be safely administered to all patients with egg allergy. The AAP recommendations have been based, in part, on scientific evidence supporting the routine use of one-dose administration of the MMR vaccine to egg-allergic patients. This includes those patients with a history of severe, generalized anaphylactic reactions to egg.          Egg Allergy  Egg allergy is one of the most common food allergies in children, second only to milk allergy. Symptoms of an egg allergy reaction can range from mild, such as hives, to severe, such as anaphylaxis. Therefore, it is advised the people with egg allergy have quick access to an epinephrine auto-injector (such as an EpiPen , Auvi-Q  or Adrenaclick ) at all times. To prevent a reaction, strict avoidance of egg and egg products is essential. Always read ingredient labels to identify egg ingredients. Most children eventually outgrow an allergy to egg.  While the whites of an egg contain the allergenic proteins, patients with an egg allergy must avoid all eggs completely (the egg white and the egg yolk). This is because it is impossible to separate the egg white completely from the yolk, causing a cross-contact issue.  Egg Allergy and Vaccines  Some vaccines  "contain egg protein. The recommendations of the American Academy of Pediatrics (AAP) acknowledge that the MMR vaccine (measles-mumps-rubella) can be safely administered to all patients with egg allergy. These recommendations have been based, in part, on scientific evidence that supports the routine use of one-dose administration of the MMR vaccine to patients with an egg allergy. This includes those patients with a history of severe, generalized anaphylactic reactions to egg.     Influenza vaccines usually contain a small amount of egg protein. According to the American Academy of Allergy, Asthma & Immunology (AAAAI): \"Studies show that flu vaccines can be safely administered to egg allergic individuals, either in the primary care provider's office or allergist's office depending on the severity of the allergic reaction to eating eggs.\" If you or your child is allergic to eggs, speak to your doctor before receiving a flu shot. Read more about egg allergy and the flu vaccine>  Avoiding Eggs  The federal Food Allergen Labeling and Consumer Protection Act (FALCPA) requires that all packaged food products sold in the [place] that contains egg as an ingredient must list the word  Egg  on the label.  Read all product labels carefully before purchasing and consuming any item. Ingredients in packaged food product may change without warning, so check ingredient statements carefully every time you shop. If you have questions, call the .  As of this time, the use of advisory labels (such as  May Contain ) on packaged foods is voluntary, and there are no guidelines for their use. However, the FDA has begun to develop a long-term strategy to help manufacturers use these statements in a clear and consistent manner, so that consumers with food allergies and their caregivers can be informed as to the potential presence of the eight major allergens.  Read more about food labels>  Avoid foods that contain eggs or any of " these ingredients:  Albumin (also spelled albumen)   Egg (dried, powdered, solids, white, yolk)   Eggnog   Lysozyme   Mayonnaise   Meringue (meringue powder)   Ovalbumin   Surimi  Eggs are sometimes found in the following:  Baked goods   Egg substitutes   Lecithin   Macaroni   Marzipan   Marshmallows   Nougat   Pasta  Some Unexpected Sources of Egg*  Eggs have been used to create the foam or topping on specialty coffee drinks and are used in some bar drinks.   Some commercial brands of egg substitutes contain egg whites.   Most commercially processed cooked pastas (including those used in prepared foods such as soup) contain egg or are processed on equipment shared with egg-containing pastas. Boxed, dry pastas are usually egg-free, but may be processed on equipment that is also used for egg-containing products. Fresh pasta is sometimes egg-free, too. Read the label or ask about ingredients before eating pasta.   Egg wash is sometimes used on pretzels before they are dipped in salt.  *Note: This list highlights examples of where eggs have been unexpectedly found (e.g., on a food label for a specific product, in a restaurant meal, in [employer] cookery). This list does not imply that eggs are always present in these foods; it is intended to serve as a reminder to always read the label and ask questions about ingredients before eating a food that you have not prepared yourself.  Keep the following in mind:  Individuals with egg allergy should also avoid eggs from duck, turkey, goose, quail, etc., as these are known to be cross-reactive with chicken egg.            Follow-ups after your visit        Who to contact     If you have questions or need follow up information about today's clinic visit or your schedule please contact [facility] directly at [phone].  Normal or non-critical lab and imaging results will be communicated to you by MyChart, letter or phone within 4 business days after the clinic has  "received the results. If you do not hear from us within 7 days, please contact the clinic through [facility] or phone. If you have a critical or abnormal lab result, we will notify you by phone as soon as possible.  Submit refill requests through [facility] or call your pharmacy and they will forward the refill request to us. Please allow 3 business days for your refill to be completed.          Additional Information About Your Visit        [facility] Information     [facility] lets you send messages to your doctor, view your test results, renew your prescriptions, schedule appointments and more. To sign up, go to [email or web address] . Click on \"Log in\" on the left side of the screen, which will take you to the Welcome page. Then click on \"Sign up Now\" on the right side of the page.     You will be asked to enter the access code listed below, as well as some personal information. Please follow the directions to create your username and password.     Your access code is: [email or web address]  Expires: 2017  1:06 PM     Your access code will  in 90 days. If you need help or a new code, please call your [place] clinic or [phone].        Care EveryWhere ID     This is your Care EveryWhere ID. This could be used by other organizations to access your [place] medical records  [id number]        Your Vitals Were     Pulse Height BMI (Body Mass Index)             87 4' 11.06\" (1.5 m) 24.76 kg/m2          Blood Pressure from Last 3 Encounters:   17 149/88   17 132/83   17 101/53    Weight from Last 3 Encounters:   17 122 lb 12.7 oz (55.7 kg)   17 123 lb (55.8 kg)   17 122 lb 2.2 oz (55.4 kg)              We Performed the Following     Allergen clam IgE     Allergen crab IgE     Allergen egg white IgE     Allergen lobster IgE     Allergen oyster IgE     Allergen scallop IgE     Allergen shrimp IgE     ALLERGY SKIN TESTS,ALLERGENS          Today's Medication Changes          These changes " are accurate as of: 3/31/17 12:33 PM.  If you have any questions, ask your nurse or doctor.               Start taking these medicines.        Dose/Directions    azelastine 0.1 % spray   Commonly known as:  ASTELIN   Used for:  Non-seasonal allergic rhinitis due to animal hair and dander, Seasonal allergic rhinitis due to pollen   Started by:  Jose Orona MD        Dose:  2 spray   Spray 2 sprays into both nostrils 2 times daily as needed for rhinitis   Quantity:  1 Bottle   Refills:  3       EPINEPHrine 0.3 MG/0.3ML injection   Used for:  Reaction to food, initial encounter   Started by:  Jose Orona MD        Dose:  0.3 mg   Inject 0.3 mLs (0.3 mg) into the muscle as needed for anaphylaxis   Quantity:  0.6 mL   Refills:  3            Where to get your medicines      These medications were sent to [facility] [address]  [address] [place] [id number]     Phone:  [phone]     azelastine 0.1 % spray    EPINEPHrine 0.3 MG/0.3ML injection                Primary Care Provider Office Phone # Fax #    Lavern Jefferson,  [phone]       [address] [address]        Thank you!     Thank you for choosing [facility]  for your care. Our goal is always to provide you with excellent care. Hearing back from our patients is one way we can continue to improve our services. Please take a few minutes to complete the written survey that you may receive in the mail after your visit with us. Thank you!             Your Updated Medication List - Protect others around you: Learn how to safely use, store and throw away your medicines at www.disposemymeds.org.          This list is accurate as of: 3/31/17 12:33 PM.  Always use your most recent med list.                   Brand Name Dispense Instructions for use    * albuterol 108 (90 BASE) MCG/ACT Inhaler    VENTOLIN HFA    1 Inhaler    Inhale 2 puffs into the  lungs every 4 hours as needed for shortness of breath / dyspnea or wheezing       * albuterol (2.5 MG/3ML) 0.083% neb solution     1 Box    Take 1 vial (2.5 mg) by nebulization every 4 hours as needed for shortness of breath / dyspnea or wheezing       aspirin 81 MG chewable tablet     36 tablet    Take 1 tablet (81 mg) by mouth daily       azelastine 0.1 % spray    ASTELIN    1 Bottle    Spray 2 sprays into both nostrils 2 times daily as needed for rhinitis       cholecalciferol 1000 UNIT tablet    vitamin D     Reported on 2/15/2017       CHOLEST OFF PO      Reported on 2/28/2017       DAILY HERBS IMMUNE DEFENSE PO      Take 2 capsules by mouth every evening Reported on 2/28/2017       EPINEPHrine 0.3 MG/0.3ML injection     0.6 mL    Inject 0.3 mLs (0.3 mg) into the muscle as needed for anaphylaxis       fexofenadine 180 MG tablet    ALLEGRA    30 tablet    Take 180 mg by mouth daily Reported on 3/31/2017       fluticasone 110 MCG/ACT Inhaler    FLOVENT HFA    1 Inhaler    Inhale 2 puffs into the lungs 2 times daily       fluticasone 50 MCG/ACT spray    FLONASE    1 Bottle    Spray 1 spray into both nostrils 2 times daily Reported on 3/31/2017       KRILL OIL PO          order for DME     1 each    Equipment being ordered: Oxygen       triamcinolone 0.1 % cream    KENALOG    15 g    Apply sparingly to affected area three times daily for 14 days.       Turmeric Powd          VITAMIN A PO      Take 1 capsule by mouth daily Reported on 2/23/2017       vitamin B complex with vitamin C Tabs tablet      Take 1 tablet by mouth daily Reported on 2/15/2017       * Notice:  This list has 2 medication(s) that are the same as other medications prescribed for you. Read the directions carefully, and ask your doctor or other care provider to review them with you.

## 2017-03-31 NOTE — NURSING NOTE
Per provider verbal order, RN placed Adult Environmental scratch testing panels, shellfish panel and egg white.  Consent was obtained prior to procedure.  Once panels were placed, patient was monitored for 15 minutes in clinic.  RN read test after 15 minutes and provider was notified of results.  Pt tolerated procedure well.  All questions and concerns were addressed at office visit.     Cady Barnes RN

## 2017-03-31 NOTE — PATIENT INSTRUCTIONS
-Use azelastine 2 sprays in each nostril twice a day when necessary.  Ketotifen (Zaditor, [place]) 1 drop/eye twice daily as needed.       AEROALLERGEN AVOIDANCE INSTRUCTIONS  POLLEN  Pollens are the tiny airborne particles given off by trees, weeds, and grasses. They can be the cause of seasonal allergic rhinitis or hay fever symptoms, which include stuffy, itchy, runny nose, redness, swelling and itching of the eyes, and itching of the ears and throat. Here are some tips on how to avoid pollen exposure.  1. .Keep windows closed and use the air conditioner when possible.  2.  Avoid outside exposure in the early morning as pollen counts are highest at that time.  3.  Take a shower and wash hair each night.  4.  Consider wearing a mask when working in the yard and/or garden.  5.  Clean furnace filter monthly with HEPA filters. Consider a HEPA filter vacuum  which will prevent pollen from being reintroduced into the air.   DUST MITES  Dust mites can never be entirely eliminated in the house no matter how clean your house is. Dust mites are attracted to warm, moist areas and feed on dead skin flakes. Here are tips to minimize dust mites in your home.  1.  Encase pillows and mattress/box springs in zippered allergy covers.  2.  Wash bedding in hot water (at least 130 F) every 7-14 days.  3.  Avoid curtains, carpet, and upholstered furniture if possible.  4.  Use HEPA air filters and a HEPA filter vacuum . Change filters monthly. Vacuum weekly.  5.  Keep bedroom simple, avoiding clutter, so it can quickly be dusted.  6.  Cover heating vents with vent filters.  7.  Keep stuffed toys in a closed container and wash or freeze regularly.  8.  Keep clothing in the closet with the door closed.  PETS  Pets present many problems for people with allergies. Dander from pets is very difficult to remove and also is a food source for dust mites.  1.  If possible, find the pet a new home.  2.  If not possible, keep the pet  outdoors. Never allow the pet into the bedroom.  3.  Wash pet weekly in warm water.  4.  Encase mattresses, pillows, and box springs in allergen-proof covers.  5.  Use HEPA air filters and a HEPA filter vacuum . Change filters monthly.      Egg Allergy   Egg allergy is estimated to affect approximately 1.5% of young children. But it s also a food allergy that is one of the most likely to be outgrown over time. Most allergic reactions associated with egg involve the skin, but anaphylaxis also can occur. Allergic reactions to egg are mostly IgE-mediated (involving IgE antibodies).  Baking  For each egg, substitute one of the following in recipes. These substitutes work well when baking from scratch and substituting 1 to 3 eggs.    1 tsp. baking powder, 1 T. liquid, 1 T. vinegar     1 tsp. yeast dissolved in 1/4 cup warm water     1 1/2 T. water, 1 1/2 T. oil, 1 tsp. baking powder     1 packet gelatin, 2 T. warm water. Do not mix until ready to use.  Some Hidden Sources of Egg    Eggs have been used to create the foam or milk topping on specialty coffee drinks and are used in some bar drinks.     Some commercial brands of egg substitutes contain egg whites.     Most commercially processed cooked pastas (including those used in prepared foods such as soup) contain egg or are processed on equipment shared with egg-containing pastas. Boxed, dry pastas are usually egg-free, but may be processed on equipment that is also used for egg-containing products. Fresh pasta is sometimes egg-free, too. Read the label or ask about ingredients before eating pasta.     Egg wash is sometimes used on pretzels before they are dipped in salt.   Commonly Asked Questions  Is a flu shot safe for an individual with an egg allergy?  Influenza vaccines are grown on egg embryos and may contain a small amount of egg protein. However, egg allergic patients are able to receive the influenza vaccination as long as received in a physician  office equipped to treat anaphylaxis.     Can an MMR Vaccine be given to an individual with an egg allergy?  The recommendations of the American Academy of Pediatrics (AAP) acknowledge that the MMR vaccine can be safely administered to all patients with egg allergy. The AAP recommendations have been based, in part, on scientific evidence supporting the routine use of one-dose administration of the MMR vaccine to egg-allergic patients. This includes those patients with a history of severe, generalized anaphylactic reactions to egg.          Egg Allergy  Egg allergy is one of the most common food allergies in children, second only to milk allergy. Symptoms of an egg allergy reaction can range from mild, such as hives, to severe, such as anaphylaxis. Therefore, it is advised the people with egg allergy have quick access to an epinephrine auto-injector (such as an EpiPen , Auvi-Q  or Adrenaclick ) at all times. To prevent a reaction, strict avoidance of egg and egg products is essential. Always read ingredient labels to identify egg ingredients. Most children eventually outgrow an allergy to egg.  While the whites of an egg contain the allergenic proteins, patients with an egg allergy must avoid all eggs completely (the egg white and the egg yolk). This is because it is impossible to separate the egg white completely from the yolk, causing a cross-contact issue.  Egg Allergy and Vaccines  Some vaccines contain egg protein. The recommendations of the American Academy of Pediatrics (AAP) acknowledge that the MMR vaccine (measles-mumps-rubella) can be safely administered to all patients with egg allergy. These recommendations have been based, in part, on scientific evidence that supports the routine use of one-dose administration of the MMR vaccine to patients with an egg allergy. This includes those patients with a history of severe, generalized anaphylactic reactions to egg.     Influenza vaccines usually contain a small  "amount of egg protein. According to the American Academy of Allergy, Asthma & Immunology (AAAAI): \"Studies show that flu vaccines can be safely administered to egg allergic individuals, either in the primary care provider's office or allergist's office depending on the severity of the allergic reaction to eating eggs.\" If you or your child is allergic to eggs, speak to your doctor before receiving a flu shot. Read more about egg allergy and the flu vaccine>  Avoiding Eggs  The federal Food Allergen Labeling and Consumer Protection Act (FALCPA) requires that all packaged food products sold in the [place] that contains egg as an ingredient must list the word  Egg  on the label.  Read all product labels carefully before purchasing and consuming any item. Ingredients in packaged food product may change without warning, so check ingredient statements carefully every time you shop. If you have questions, call the .  As of this time, the use of advisory labels (such as  May Contain ) on packaged foods is voluntary, and there are no guidelines for their use. However, the FDA has begun to develop a long-term strategy to help manufacturers use these statements in a clear and consistent manner, so that consumers with food allergies and their caregivers can be informed as to the potential presence of the eight major allergens.  Read more about food labels>  Avoid foods that contain eggs or any of these ingredients:  Albumin (also spelled albumen)   Egg (dried, powdered, solids, white, yolk)   Eggnog   Lysozyme   Mayonnaise   Meringue (meringue powder)   Ovalbumin   Surimi  Eggs are sometimes found in the following:  Baked goods   Egg substitutes   Lecithin   Macaroni   Marzipan   Marshmallows   Nougat   Pasta  Some Unexpected Sources of Egg*  Eggs have been used to create the foam or topping on specialty coffee drinks and are used in some bar drinks.   Some commercial brands of egg substitutes contain egg whites. "   Most commercially processed cooked pastas (including those used in prepared foods such as soup) contain egg or are processed on equipment shared with egg-containing pastas. Boxed, dry pastas are usually egg-free, but may be processed on equipment that is also used for egg-containing products. Fresh pasta is sometimes egg-free, too. Read the label or ask about ingredients before eating pasta.   Egg wash is sometimes used on pretzels before they are dipped in salt.  *Note: This list highlights examples of where eggs have been unexpectedly found (e.g., on a food label for a specific product, in a restaurant meal, in [place] cookery). This list does not imply that eggs are always present in these foods; it is intended to serve as a reminder to always read the label and ask questions about ingredients before eating a food that you have not prepared yourself.  Keep the following in mind:  Individuals with egg allergy should also avoid eggs from duck, turkey, goose, quail, etc., as these are known to be cross-reactive with chicken egg.

## 2017-04-02 PROBLEM — J45.909 UNSPECIFIED ASTHMA, UNCOMPLICATED: Status: ACTIVE | Noted: 2017-04-02

## 2017-04-02 PROBLEM — J30.1 SEASONAL ALLERGIC RHINITIS DUE TO POLLEN: Status: ACTIVE | Noted: 2017-04-02

## 2017-04-02 PROBLEM — J45.909 UNSPECIFIED ASTHMA, UNCOMPLICATED: Status: RESOLVED | Noted: 2017-04-02 | Resolved: 2017-04-02

## 2017-04-02 PROBLEM — T78.1XXA REACTION TO FOOD, INITIAL ENCOUNTER: Status: RESOLVED | Noted: 2017-04-02 | Resolved: 2017-04-02

## 2017-04-02 PROBLEM — T78.1XXA REACTION TO FOOD, INITIAL ENCOUNTER: Status: ACTIVE | Noted: 2017-04-02

## 2017-04-02 PROBLEM — J30.81 NON-SEASONAL ALLERGIC RHINITIS DUE TO ANIMAL HAIR AND DANDER: Status: ACTIVE | Noted: 2017-04-02

## 2017-04-03 LAB
CLAM IGE QN: NORMAL KU(A)/L
CRAB IGE QN: NORMAL KU(A)/L
EGG WHITE IGE QN: NORMAL KU(A)/L
LOBSTER IGE QN: NORMAL KU(A)/L
OYSTER IGE QN: NORMAL KU(A)/L
SCALLOP IGE QN: NORMAL KU(A)/L
SHRIMP IGE QN: NORMAL KU(A)/L

## 2017-04-03 NOTE — NURSING NOTE
Writer demonstrated Epipen technique.  Informed that patient must pull blue end off of pen before use.  Hold firmly in one hand and press down into the thigh. The injection should go in the middle, outer thigh and hold for 10 seconds to ensure the delivery of all medication.  Informed that the needle can go through clothing but that any seams should be avoided. Patient advised that once used, needle will not be exposed, as it retracts back into the device. Patient was able to practice with the training device and demonstrated correct technique. Patient advised to call 911 or go to emergency department after Epipen use for further monitoring. Instructed to keep both pens together in case a second dose is needed. Instructed to keep Epipen out of extreme temperatures.  Check expiration date.  Do not use if    Patient verbalized understanding.    Cady Barnes

## 2017-04-06 NOTE — PROGRESS NOTES
Negative serum IgE for shellfish and egg white.  As patient was told in the past, sometimes, skin test can be positive without a true clinical relevance.  -Suggest oral food challenge test for egg, once her chest symptoms are well-controlled, if she is interested.

## 2017-04-19 ENCOUNTER — CARE COORDINATION (OUTPATIENT)
Dept: CARE COORDINATION | Facility: CLINIC | Age: 67
End: 2017-04-19

## 2017-04-19 NOTE — PROGRESS NOTES
Clinic Care Coordination Contact  Care Team Conversations        Clinical Data:   RN CC called to f/u with pt.   Pt f/u'd with allergy  She stopped taking medications-only taken natural/herbal except for allergy medication - feels better, less cough and less chest tightness since stopping nebs/inhalers  Reviewed need for CT and thyroid bx  Pt is going to weigh options -but verbalizes understanding of the need for these tests  See note- pt declined to see pulmonology    Plan:   1. Pt to schedule CT and bx if she chooses. Pt verbalizes she knows who to call to set this up.   2. Pt has RN CC contact information and was encouraged to outreach to RN CC with any new questions or concerns.   3. Close to CC outreaches at this time.     .  Alka Green RN, BSN, PHN   Clinic Care Coordinator  [facility]:  [facility] [email or web address]   Office: [phone] Fax: [phone]

## 2017-04-28 ENCOUNTER — HOSPITAL ENCOUNTER (EMERGENCY)
Facility: CLINIC | Age: 67
Discharge: HOME | End: 2017-04-28
Attending: PHYSICIAN ASSISTANT | Admitting: PHYSICIAN ASSISTANT
Payer: COMMERCIAL

## 2017-04-28 VITALS
DIASTOLIC BLOOD PRESSURE: 89 MMHG | RESPIRATION RATE: 16 BRPM | WEIGHT: 120 LBS | SYSTOLIC BLOOD PRESSURE: 161 MMHG | TEMPERATURE: 97.5 F | OXYGEN SATURATION: 100 % | BODY MASS INDEX: 24.19 KG/M2

## 2017-04-28 DIAGNOSIS — B96.89 LOCALIZED BACTERIAL SKIN INFECTION: ICD-10-CM

## 2017-04-28 DIAGNOSIS — L08.9 LOCALIZED BACTERIAL SKIN INFECTION: ICD-10-CM

## 2017-04-28 PROCEDURE — 99213 OFFICE O/P EST LOW 20 MIN: CPT | Performed by: PHYSICIAN ASSISTANT

## 2017-04-28 PROCEDURE — 99213 OFFICE O/P EST LOW 20 MIN: CPT

## 2017-04-28 RX ORDER — SULFAMETHOXAZOLE/TRIMETHOPRIM 800-160 MG
1 TABLET ORAL 2 TIMES DAILY
Qty: 14 TABLET | Refills: 0 | Status: SHIPPED | OUTPATIENT
Start: 2017-04-28 | End: 2017-05-05

## 2017-04-28 NOTE — ED AVS SNAPSHOT
[facility]    [address] [facility] [address] [address]    Phone:  [phone]    Fax:  [phone]                                       Karen Guevara   MRN: [id number]    Department:  [facility]   Date of Visit:  4/28/2017           After Visit Summary Signature Page     I have received my discharge instructions, and my questions have been answered. I have discussed any challenges I see with this plan with the nurse or doctor.    ..........................................................................................................................................  Patient/Patient Representative Signature      ..........................................................................................................................................  Patient Representative Print Name and Relationship to Patient    ..................................................               ................................................  Date                                            Time    ..........................................................................................................................................  Reviewed by Signature/Title    ...................................................              ..............................................  Date                                                            Time

## 2017-04-28 NOTE — ED PROVIDER NOTES
History     Chief Complaint   Patient presents with     Sore     pt has a sore on her rt wrist, she state is was like a blister and she drained it now it's very sore, thinks it's infected      HPI  Karen Guevara is a 66 year old female who presents to the  with concerns over a lesion on her right wrist.  Patient states that one week ago she developed several pruritic blisterlike sores on the volar aspect of her right wrist.  She states that she attempted to drain them.  Over the next several days she developed increasing erythema and pain.  She has attempted to treat with several medications including triamcinolone from an old prescription and over-the-counter antibacterial ointment without significant improvement.  She states that during the development of her rash she did also develop fever up to 101 at home, and generalized rash consistent with hives.  She has not taken any over-the-counter medications to control fever and is afebrile upon arrival.    Past Medical History:   Diagnosis Date     Allergic rhinitis due to other allergen      Mild intermittent asthma      Other and unspecified hyperlipidemia      Unspecified hypothyroidism      Vitiligo      Current Outpatient Prescriptions   Medication Sig Dispense Refill     sulfamethoxazole-trimethoprim (BACTRIM DS) 800-160 MG per tablet Take 1 tablet by mouth 2 times daily for 7 days 14 tablet 0     EPINEPHrine 0.3 MG/0.3ML injection Inject 0.3 mLs (0.3 mg) into the muscle as needed for anaphylaxis 0.6 mL 3     azelastine (ASTELIN) 0.1 % spray Spray 2 sprays into both nostrils 2 times daily as needed for rhinitis 1 Bottle 3     fluticasone (FLOVENT HFA) 110 MCG/ACT Inhaler Inhale 2 puffs into the lungs 2 times daily (Patient not taking: Reported on 3/31/2017) 1 Inhaler 11     aspirin 81 MG chewable tablet Take 1 tablet (81 mg) by mouth daily 36 tablet 0     KRILL OIL PO        Turmeric POWD        order for DME Equipment being ordered: Oxygen (Patient not  taking: Reported on 3/31/2017) 1 each 11     albuterol (2.5 MG/3ML) 0.083% neb solution Take 1 vial (2.5 mg) by nebulization every 4 hours as needed for shortness of breath / dyspnea or wheezing (Patient not taking: Reported on 3/31/2017) 1 Box 11     triamcinolone (KENALOG) 0.1 % cream Apply sparingly to affected area three times daily for 14 days. (Patient not taking: Reported on 3/31/2017) 15 g 0     albuterol (VENTOLIN HFA) 108 (90 BASE) MCG/ACT Inhaler Inhale 2 puffs into the lungs every 4 hours as needed for shortness of breath / dyspnea or wheezing (Patient not taking: Reported on 3/31/2017) 1 Inhaler 11     VITAMIN A PO Take 1 capsule by mouth daily Reported on 2/23/2017       [facility] (DAILY HERBS IMMUNE DEFENSE PO) Take 2 capsules by mouth every evening Reported on 2/28/2017       vitamin B complex with vitamin C (VITAMIN  B COMPLEX) TABS tablet Take 1 tablet by mouth daily Reported on 2/15/2017       fexofenadine (ALLEGRA) 180 MG tablet Take 180 mg by mouth daily Reported on 3/31/2017 30 tablet 1     fluticasone (FLONASE) 50 MCG/ACT nasal spray Spray 1 spray into both nostrils 2 times daily Reported on 3/31/2017 1 Bottle 11     VITAMIN D 1000 UNIT OR TABS Reported on 2/15/2017       CHOLEST OFF OR Reported on 2/28/2017       Social History   Substance Use Topics     Smoking status: Never Smoker     Smokeless tobacco: Never Used     Alcohol use No     I have reviewed the Medications, Allergies, Past Medical and Surgical History, and Social History in the Epic system.    Review of Systems  CONSTITUTIONAL:POSITIVE  for fever up to 101 orally and chills NEGATIVE  for myalgias  INTEGUMENTARY/SKIN: POSITIVE for painful lesion on right wrist, resolved rash   EYES: NEGATIVE for vision changes or irritation  ENT/MOUTH: NEGATIVE for ear, mouth and throat problems  RESP:NEGATIVE for significant cough or SOB  GI: NEGATIVE for nausea, abdominal pain, heartburn, or change in bowel habits  Physical Exam    /89  Temp 97.5  F (36.4  C) (Oral)  Resp 16  Wt 54.4 kg (120 lb)  SpO2 100%  BMI 24.19 kg/m2  Physical Exam   Constitutional: She appears well-developed and well-nourished. No distress.   Cardiovascular: Normal rate, regular rhythm and normal heart sounds.  Exam reveals no gallop and no friction rub.    No murmur heard.  Pulmonary/Chest: Effort normal and breath sounds normal. No respiratory distress. She has no wheezes. She has no rales.   Musculoskeletal:        Right wrist: She exhibits decreased range of motion (actively due to discomfort, full passive ROM) and tenderness. She exhibits no bony tenderness, no swelling, no effusion, no crepitus, no deformity and no laceration.        Right hand: She exhibits normal range of motion, no bony tenderness and no swelling.   Skin: Skin is warm and dry.   1 cm x 3 cm shallow open ulceration with additional 1 cm of surrounding erythema.       ED Course     ED Course     Procedures        Critical Care time:  none            Labs Ordered and Resulted from Time of ED Arrival Up to the Time of Departure from the ED - No data to display    Assessments & Plan (with Medical Decision Making)     I have reviewed the nursing notes.    I have reviewed the findings, diagnosis, plan and need for follow up with the patient.    Discharge Medication List as of 4/28/2017  4:09 PM      START taking these medications    Details   sulfamethoxazole-trimethoprim (BACTRIM DS) 800-160 MG per tablet Take 1 tablet by mouth 2 times daily for 7 days, Disp-14 tablet, R-0, E-Prescribe           Final diagnoses:   Localized bacterial skin infection     66-year-old female presents to urgent care with concerns over possible skin infection after she noted pruritic vesicles one week ago which has since broken open and developed increasing erythema, pain.  Patient was noted to be hypertensive upon arrival, remainder of vital signs within normal limits.  Physical exam findings as described  above are consistent with a shallow ulceration with concern over developing secondary infection given surrounding erythema.  However there was no warmth to suggest cellulitis and no swelling to suggest abscess.  Given presence of vesicles differential for her symptoms would also include contact dermatitis, allergic response.  Although patient states she is running a temperature at home, she is afebrile without antipyretics in the department.  She was therefore discharged home stable with prescription for bactrim.  Follow up with PCP if no improvement in 3-5 days.  worrisome reasons to return to ER/UC sooner discussed.      Disclaimer: This note consists of symbols derived from keyboarding, dictation, and/or voice recognition software. As a result, there may be errors in the script that have gone undetected.  Please consider this when interpreting information found in the chart.    4/28/2017   [facility] EMERGENCY DEPARTMENT     Fatou Rodas PA-C  04/28/17 2014

## 2017-04-28 NOTE — ED AVS SNAPSHOT
[facility] Emergency Department    [address] [address]    Phone:  [phone]    Fax:  [phone]                                       Karen Guevara   MRN: [id number]    Department:  [facility] Emergency Department   Date of Visit:  4/28/2017           Patient Information     Date Of Birth          1950        Your diagnoses for this visit were:     Localized bacterial skin infection        You were seen by Fatou Rodas PA-C.      Follow-up Information     Follow up with Lavern Jefferson DO In 3 days.    Specialty:  Internal Medicine    Why:  if no improvement or sooner if new or worsening symptoms     Contact information:    [facility]  [address] [address]  [phone]        24 Hour Appointment Hotline       To make an appointment at any [facility], call [phone] ([phone]). If you don't have a family doctor or clinic, we will help you find one. [facility] are conveniently located to serve the needs of you and your family.             Review of your medicines      START taking        Dose / Directions Last dose taken    sulfamethoxazole-trimethoprim 800-160 MG per tablet   Commonly known as:  BACTRIM DS   Dose:  1 tablet   Quantity:  14 tablet        Take 1 tablet by mouth 2 times daily for 7 days   Refills:  0          Our records show that you are taking the medicines listed below. If these are incorrect, please call your family doctor or clinic.        Dose / Directions Last dose taken    * albuterol 108 (90 BASE) MCG/ACT Inhaler   Commonly known as:  VENTOLIN HFA   Dose:  2 puff   Quantity:  1 Inhaler        Inhale 2 puffs into the lungs every 4 hours as needed for shortness of breath / dyspnea or wheezing   Refills:  11        * albuterol (2.5 MG/3ML) 0.083% neb solution   Dose:  1 vial   Quantity:  1 Box        Take 1 vial (2.5 mg) by nebulization every 4 hours as needed for shortness of breath / dyspnea  or wheezing   Refills:  11        aspirin 81 MG chewable tablet   Dose:  81 mg   Quantity:  36 tablet        Take 1 tablet (81 mg) by mouth daily   Refills:  0        azelastine 0.1 % spray   Commonly known as:  ASTELIN   Dose:  2 spray   Quantity:  1 Bottle        Spray 2 sprays into both nostrils 2 times daily as needed for rhinitis   Refills:  3        cholecalciferol 1000 UNIT tablet   Commonly known as:  vitamin D        Reported on 2/15/2017   Refills:  0        CHOLEST OFF PO        Reported on 2/28/2017   Refills:  0        DAILY HERBS IMMUNE DEFENSE PO   Dose:  2 capsule        Take 2 capsules by mouth every evening Reported on 2/28/2017   Refills:  0        EPINEPHrine 0.3 MG/0.3ML injection   Dose:  0.3 mg   Quantity:  0.6 mL        Inject 0.3 mLs (0.3 mg) into the muscle as needed for anaphylaxis   Refills:  3        fexofenadine 180 MG tablet   Commonly known as:  ALLEGRA   Dose:  180 mg   Quantity:  30 tablet        Take 180 mg by mouth daily Reported on 3/31/2017   Refills:  1        fluticasone 110 MCG/ACT Inhaler   Commonly known as:  FLOVENT HFA   Dose:  2 puff   Quantity:  1 Inhaler        Inhale 2 puffs into the lungs 2 times daily   Refills:  11        fluticasone 50 MCG/ACT spray   Commonly known as:  FLONASE   Dose:  1 spray   Quantity:  1 Bottle        Spray 1 spray into both nostrils 2 times daily Reported on 3/31/2017   Refills:  11        KRILL OIL PO        Refills:  0        order for DME   Quantity:  1 each        Equipment being ordered: Oxygen   Refills:  11        triamcinolone 0.1 % cream   Commonly known as:  KENALOG   Quantity:  15 g        Apply sparingly to affected area three times daily for 14 days.   Refills:  0        Turmeric Powd        Refills:  0        VITAMIN A PO   Dose:  1 capsule        Take 1 capsule by mouth daily Reported on 2/23/2017   Refills:  0        vitamin B complex with vitamin C Tabs tablet   Dose:  1 tablet        Take 1 tablet by mouth daily Reported  on 2/15/2017   Refills:  0        * Notice:  This list has 2 medication(s) that are the same as other medications prescribed for you. Read the directions carefully, and ask your doctor or other care provider to review them with you.            Prescriptions were sent or printed at these locations (1 Prescription)                   [facility] [place], [place] - [address]   [address] [address]    Telephone:  [phone]   Fax:  [phone]   Hours:                  E-Prescribed (1 of 1)         sulfamethoxazole-trimethoprim (BACTRIM DS) 800-160 MG per tablet                Orders Needing Specimen Collection     None      Pending Results     No orders found from 4/26/2017 to 4/29/2017.            Pending Culture Results     No orders found from 4/26/2017 to 4/29/2017.            Test Results From Your Hospital Stay               Thank you for choosing [facility]       Thank you for choosing [facility] for your care. Our goal is always to provide you with excellent care. Hearing back from our patients is one way we can continue to improve our services. Please take a few minutes to complete the written survey that you may receive in the mail after you visit with us. Thank you!        [facility] Information     [facility] gives you secure access to your electronic health record. If you see a primary care provider, you can also send messages to your care team and make appointments. If you have questions, please call your primary care clinic.  If you do not have a primary care provider, please call [phone] and they will assist you.        Care EveryWhere ID     This is your Care EveryWhere ID. This could be used by other organizations to access your [facility] medical records  [id number]        After Visit Summary       This is your record. Keep this with you and show to your community pharmacist(s) and doctor(s) at your next visit.

## 2017-06-15 ENCOUNTER — TELEPHONE (OUTPATIENT)
Dept: FAMILY MEDICINE | Facility: CLINIC | Age: 67
End: 2017-06-15

## 2017-06-15 DIAGNOSIS — R93.89 ABNORMAL CHEST CT: Primary | ICD-10-CM

## 2017-06-15 NOTE — TELEPHONE ENCOUNTER
----- Message from Awa Zaragoza sent at 6/15/2017 11:52 AM CDT -----  Regarding: CT Chest w/ Contrast  Hi Dr. Jefferson,    I got Karen scheduled for her CT Chest w/ Contrast ordered by you on 2/28/17. I noticed that the order didn't have a diagnosis and we would need a diagnosis attached to the order. If you could add a diagnosis that would be greatly appreciated.    Thank you,  Awa

## 2017-06-16 RX ORDER — IOPAMIDOL 755 MG/ML
80 INJECTION, SOLUTION INTRAVASCULAR ONCE
Status: DISCONTINUED | OUTPATIENT
Start: 2017-06-16 | End: 2017-06-19 | Stop reason: CLARIF

## 2017-06-19 ENCOUNTER — HOSPITAL ENCOUNTER (OUTPATIENT)
Dept: CT IMAGING | Facility: CLINIC | Age: 67
Discharge: HOME | End: 2017-06-19
Attending: INTERNAL MEDICINE | Admitting: INTERNAL MEDICINE
Payer: COMMERCIAL

## 2017-06-19 DIAGNOSIS — R93.89 ABNORMAL CHEST CT: ICD-10-CM

## 2017-06-19 PROBLEM — R91.8 PULMONARY NODULES: Status: ACTIVE | Noted: 2017-06-19

## 2017-06-19 PROCEDURE — 71250 CT THORAX DX C-: CPT

## 2017-09-12 ENCOUNTER — TELEPHONE (OUTPATIENT)
Dept: FAMILY MEDICINE | Facility: CLINIC | Age: 67
End: 2017-09-12

## 2017-09-12 NOTE — TELEPHONE ENCOUNTER
Panel Management Review      Patient has the following on her problem list: None      Composite cancer screening  Chart review shows that this patient is due/due soon for the following Mammogram and Colonoscopy  Summary:    Patient is due/failing the following:   ACT, COLONOSCOPY and MAMMOGRAM    Action needed:   Patient needs referral/order:  mammogram  patient declined colonoscopy    Type of outreach:    Phone, spoke to patient.  Patient declined colonoscopy, BP check w/ RN and mammogram.  Said she was told no mammogram needed after age 65.  Will complete ACT and mail back to clinic.     Questions for provider review:    None                                                                                                                                    Maegan PRITCHETT CMA ([facility])

## 2017-09-12 NOTE — LETTER
September 12, 2017      Karen R Ismael  [address] [place] [id number]        Dr. Jeong's Care Team has been reviewing your chart and at this time you are due for an ACT (asthma control test).  You are also due for a mammogram and it is recommended that women have mammograms until age 74. Please call and schedule an appointment.   We are trying to help our patients achieve and maintain their health care goals.  If you are receiving your healthcare at another facility, please notify us of that as well, as we will update your chart.      If you have any questions, please feel free to call the clinic at [phone].    Thank you.              Sincerely,        Nik Jeong MD/salome

## 2017-12-22 ENCOUNTER — TELEPHONE (OUTPATIENT)
Dept: FAMILY MEDICINE | Facility: CLINIC | Age: 67
End: 2017-12-22

## 2017-12-22 NOTE — TELEPHONE ENCOUNTER
"12/22/2017        Patient Communication Preferences indicate  Do not contact  and/or communication by \"Phone\" is not preferred. Call not required per Outreach team.        Outreach ,  Missael Quintana     "

## 2019-09-29 ENCOUNTER — HEALTH MAINTENANCE LETTER (OUTPATIENT)
Age: 69
End: 2019-09-29

## 2020-03-15 ENCOUNTER — HEALTH MAINTENANCE LETTER (OUTPATIENT)
Age: 70
End: 2020-03-15

## 2021-01-10 ENCOUNTER — HEALTH MAINTENANCE LETTER (OUTPATIENT)
Age: 71
End: 2021-01-10

## 2021-05-08 ENCOUNTER — HEALTH MAINTENANCE LETTER (OUTPATIENT)
Age: 71
End: 2021-05-08

## 2021-10-23 ENCOUNTER — HEALTH MAINTENANCE LETTER (OUTPATIENT)
Age: 71
End: 2021-10-23

## 2022-06-04 ENCOUNTER — HEALTH MAINTENANCE LETTER (OUTPATIENT)
Age: 72
End: 2022-06-04

## 2022-08-03 NOTE — PROGRESS NOTES
SUBJECTIVE:                                                               Karen Guevara 66 year old female presents today to our Allergy Clinic at [facility] she is being seen for a new patient is it.  The patient is a 66-year-old female with a history of mild persistent asthma, Raynaud's, acute respiratory failure mixed hyperlipidemia and hypothyroidism.    She does have a referral from Lavern Galvan done on February 6, 2017 for asthma, but she states she is here to find out what she is allergic to.  She is not really interested in discussing asthma.  However, from what I understand, the patient has a history of asthma since her 30s, usually exacerbated by smoke, strong odors and pets.  Per notes, history of noncompliance with medications.  Patient states that she doesn't like using albuterol because it provokes her cough.  She is not on any controls.    Last year, she has been having frequent cough, wheezing and shortness of breath.  She was hospitalized in February for 2 days, for hypoxemia (she does have Raynaud's syndrome) and fever.  According to the notes, was diagnosed with basilar pneumonia based on chest x-ray.  Chest CT with bilateral parenchymal abnormalities.  Possible bronchiectasis.  She had PFTs in February, with significantly reduced DLCO; but normal lung volumes.  No reversibility after nebulized albuterol.  She was recommended Pulmonology evaluation, but she failed to have it.    Since she was 30 years old, she has been having perennial but seasonally exacerbated (Winter) chronic nasal symptoms (itchiness, rhinorrhea, stuffiness and sneezing).  When she is using Flonase (fluticasone propionate) intermittently, it is helpful, but she also thinks that intranasal sprays cause cough and she would prefer not to use them.  She reports having a history of hives after taking Claritin on several occasions.  She thinks that cetirizine was causing tightness in her chest, and she stopped it.   Not sure if it was helpful.  Other antihistamines were not helpful either.  Years ago, she was tested positive for dust mite, cat, dog and tree pollen.  There is no history of ear tubes, sinus surgeries or tonsillectomy/adenoidectomy.    The patient also requests to be tested for shellfish and egg.    She states that several days ago, after eating shrimp, 2 hours of ingestion, her cough got worse.  Of note, she has been coughing every day.  She took some water with parsley and lemon in her cough got better within 30 minutes.  She states that she also developed some itchiness on her back which is still occasionally itchy.    Until several months ago, she was able to eat eggs without any problems.  On several occasions, reproducibly, each time she would eat Scrambled eggs, within 30 minutes, she would develop excessive coughing.  No visible angioedema, throat closing sensation or urticaria.  Again, she has baseline cough.  Each time she would have this cough, she was drinking water with oregano, lemon and turmeric and the cough would get better in 60 minutes.      Patient Active Problem List   Diagnosis     HYPOPIGMENTATION     Mixed hyperlipidemia     Hypothyroidism     Plantar fascial fibromatosis     Mild persistent asthma with exacerbation     Left sided sciatica     HYPERLIPIDEMIA LDL GOAL <160     Community acquired pneumonia     Acute respiratory failure with hypoxia (H)     Acute hypoxemic respiratory failure (H)     Chest pain, atypical     Elevated troponin     Elevated troponin     Mild persistent asthma without complication     Raynaud's phenomenon without gangrene     Non-seasonal allergic rhinitis due to animal hair and dander     Seasonal allergic rhinitis due to pollen     Reaction to food, initial encounter     Unspecified asthma, uncomplicated       Past Medical History:   Diagnosis Date     Allergic rhinitis due to other allergen      Mild intermittent asthma      Other and unspecified  hyperlipidemia      Unspecified hypothyroidism      Vitiligo       Problem (# of Occurrences) Relation (Name,Age of Onset)    CANCER (1) Mother: myelodysplasia, diabetes    DIABETES (1) Brother (Juan)    HEART DISEASE (2) Father, Brother (Carlos A): sudden death    Hypertension (1) Father    Respiratory (1) Daughter (Florencio)        Past Surgical History:   Procedure Laterality Date     SURGICAL HISTORY OF -   1989    partial Lt thyroidectomy     Social History     Social History     Marital status:      Spouse name: N/A     Number of children: N/A     Years of education: N/A     Social History Main Topics     Smoking status: Never Smoker     Smokeless tobacco: Never Used     Alcohol use No     Drug use: No     Sexual activity: No     Other Topics Concern     Parent/Sibling W/ Cabg, Mi Or Angioplasty Before 65f 55m? No     Social History Narrative    March 31, 2017        ENVIRONMENTAL HISTORY: [place] The home is heated with a forced air and gas furnace. They does have central air conditioning. The patient's bedroom is furnished with Indoor plants, carpeting in bedroom, allergen mattress cover and fabric window coverings.  Pets inside the house include none. There is not history of cockroach or mice infestation. There is/are 0 smokers in the house.  The house does not have a damp basement.                Review of Systems   Constitutional: Positive for activity change. Negative for chills and fever.   HENT: Positive for congestion, postnasal drip, rhinorrhea and sneezing. Negative for ear discharge, facial swelling, nosebleeds and sinus pressure.    Eyes: Negative for discharge, redness and itching.   Respiratory: Positive for cough and shortness of breath. Negative for chest tightness and wheezing.    Cardiovascular: Negative for chest pain.   Gastrointestinal: Negative for diarrhea, nausea and vomiting.   Musculoskeletal: Negative for arthralgias and myalgias.    Skin: Positive for rash.   Allergic/Immunologic: Positive for environmental allergies.   Neurological: Negative for headaches.   Hematological: Negative for adenopathy.   Psychiatric/Behavioral: Negative for behavioral problems and self-injury.           Current Outpatient Prescriptions:      EPINEPHrine 0.3 MG/0.3ML injection, Inject 0.3 mLs (0.3 mg) into the muscle as needed for anaphylaxis, Disp: 0.6 mL, Rfl: 3     azelastine (ASTELIN) 0.1 % spray, Spray 2 sprays into both nostrils 2 times daily as needed for rhinitis, Disp: 1 Bottle, Rfl: 3     aspirin 81 MG chewable tablet, Take 1 tablet (81 mg) by mouth daily, Disp: 36 tablet, Rfl: 0     KRILL OIL PO, , Disp: , Rfl:      Turmeric POWD, , Disp: , Rfl:      VITAMIN A PO, Take 1 capsule by mouth daily Reported on 2/23/2017, Disp: , Rfl:      Misc Natural Products (DAILY HERBS IMMUNE DEFENSE PO), Take 2 capsules by mouth every evening Reported on 2/28/2017, Disp: , Rfl:      vitamin B complex with vitamin C (VITAMIN  B COMPLEX) TABS tablet, Take 1 tablet by mouth daily Reported on 2/15/2017, Disp: , Rfl:      fluticasone (FLONASE) 50 MCG/ACT nasal spray, Spray 1 spray into both nostrils 2 times daily Reported on 3/31/2017, Disp: 1 Bottle, Rfl: 11     VITAMIN D 1000 UNIT OR TABS, Reported on 2/15/2017, Disp: , Rfl:      fluticasone (FLOVENT HFA) 110 MCG/ACT Inhaler, Inhale 2 puffs into the lungs 2 times daily (Patient not taking: Reported on 3/31/2017), Disp: 1 Inhaler, Rfl: 11     order for DME, Equipment being ordered: Oxygen (Patient not taking: Reported on 3/31/2017), Disp: 1 each, Rfl: 11     albuterol (2.5 MG/3ML) 0.083% neb solution, Take 1 vial (2.5 mg) by nebulization every 4 hours as needed for shortness of breath / dyspnea or wheezing (Patient not taking: Reported on 3/31/2017), Disp: 1 Box, Rfl: 11     triamcinolone (KENALOG) 0.1 % cream, Apply sparingly to affected area three times daily for 14 days. (Patient not taking: Reported on 3/31/2017), Disp:  "15 g, Rfl: 0     albuterol (VENTOLIN HFA) 108 (90 BASE) MCG/ACT Inhaler, Inhale 2 puffs into the lungs every 4 hours as needed for shortness of breath / dyspnea or wheezing (Patient not taking: Reported on 3/31/2017), Disp: 1 Inhaler, Rfl: 11     fexofenadine (ALLEGRA) 180 MG tablet, Take 180 mg by mouth daily Reported on 3/31/2017, Disp: 30 tablet, Rfl: 1     CHOLEST OFF OR, Reported on 2/28/2017, Disp: , Rfl:   Immunization History   Administered Date(s) Administered     Influenza (H1N1) 12/15/2009     Influenza (High Dose) 3 valent vaccine 02/08/2017     Influenza (IIV3) 12/15/2009, 11/12/2010     Influenza Vaccine IM 3yrs+ 4 Valent IIV4 12/12/2013     Pneumococcal 23 valent 09/21/2015     Tdap (Adacel,Boostrix) 03/20/2006     Allergies   Allergen Reactions     Amoxicillin Nausea and Vomiting     Claritin [Loratadine] Hives     OBJECTIVE:                                                                 /88 (BP Location: Left arm, Patient Position: Chair, Cuff Size: Adult Regular)  Pulse 87  Ht 4' 11.06\" (1.5 m)  Wt 122 lb 12.7 oz (55.7 kg)  BMI 24.76 kg/m2        Physical Exam   Constitutional: No distress.   HENT:   Head: Normocephalic and atraumatic.   Right Ear: Tympanic membrane, external ear and ear canal normal.   Left Ear: Tympanic membrane, external ear and ear canal normal.   Nose: No mucosal edema or rhinorrhea.   Mouth/Throat: Oropharynx is clear and moist and mucous membranes are normal.   Eyes: Conjunctivae are normal. Right eye exhibits no discharge. Left eye exhibits no discharge.   Neck: Normal range of motion.   Cardiovascular: Normal rate, regular rhythm and normal heart sounds.    No murmur heard.  Pulmonary/Chest: Effort normal and breath sounds normal. No respiratory distress. She has no wheezes. She has no rales.   Musculoskeletal: Normal range of motion.   Lymphadenopathy:     She has no cervical adenopathy.   Neurological: She is alert.   Skin: Skin is warm. She is not " diaphoretic.   Cyanosis of all phalanxes on her hands bilaterally consistent with Raynaud's phenomenon.  Multiple areas of various pigmentation on her face, neck, trunk, arms and legs.   Psychiatric: Affect normal.   Nursing note and vitals reviewed.      WORKUP:   SPIROMETRY       FVC 1.77L (78% of predicted).     FEV1 1.44L (79% of predicted).     FEV1/FVC 81%     FEF 25%-75%  1.21L/s (71% of predicted).    The office spirometry performed today and essentially does not suggest an obstruction. There is a borderline small airway limitation.       Percutaneous skin puncture testing for aeroallergens performed today (March 31, 2017) showed sensitivity to dog, cat, dust mite, tree pollen (maple/box elder, hickory, American Elm, black walnut, birch mix and Oak), and weed pollen (English plantain, Kochia, lambs quarter and ragweed mix).  Percutaneous skin puncture testing for shellfish was negative.  Percutaneous skin puncture testing for egg was positive.  She had appropriate responses to positive and negative controls.  See scanned testing sheet for more details.    ASSESSMENT/PLAN:      Problem List Items Addressed This Visit        Respiratory    1. Non-seasonal allergic rhinitis due to animal hair and dander - Primary  -Avoidance measures.  Information provided.  -Saline rinses.  Azelastine 2 sprays in each nostril twice a day when necessary if tolerated.  She is not able to tolerate intranasal fluticasone.  Until her respiratory status is not clear, I would not want advised the patient to start allergen immunotherapy.    Relevant Medications    azelastine (ASTELIN) 0.1 % spray    2. Seasonal allergic rhinitis due to pollen    Relevant Medications    azelastine (ASTELIN) 0.1 % spray    Other Relevant Orders    ALLERGY SKIN TESTS,ALLERGENS (Completed)    Unspecified asthma, uncomplicated  In February, her PFTs had normal volumes without reversibility after nebulized albuterol.  Significantly decreased  DLCO.  Decreased DLCO is not associated with asthma.  It can be associated with pulmonary vascular disease, interstitial lung disease, or anemia(normal hemoglobin on February 23, 2017).  If the patient does have asthma, there is no obstruction, and I am not convinced her cough is completely related to it considering her DLCO.  -I tried to convince the patient to see Pulmonary.  She stated she would think about it.               Other    3. Reaction to food, initial encounter  The patient did have episodes of excessive cough after eating shellfish and eggs; however, she does have baseline cough.  No angioedema, or urticaria, or GI symptoms.  Most likely, the patient does not have an IgE mediated reaction to those foods.  However she was very adamant to get tested.  I tried to explain about false positive results, but she still insisted.  Negative percutaneous skin puncture testing for shellfish.  Ordered serum IgE.  Positive SPT for egg white.  In light of this test, I will order egg serum IgE. I still think it's likely a false positive test.  -For now, strict avoidance.  - Provided written information about food avoidance. Advised about the importance of reading labels, ordering safe foods in the restaurants and risk of cross-contamination.  - Instructed about the recognizing and treating allergic reactions.  - Instructed to carry EpiPen 2-Chris and liquid cetirizine all the time and use it accordingly in case of accidental exposure. Call 911 or see ER after use of epinephrine.  The main problem is that that oral food challenge for this patient may be challenging by itself, considering her cough.  I advised her to address that with Pulmonology.      Relevant Medications    EPINEPHrine 0.3 MG/0.3ML injection        Other Relevant Orders    Allergen egg white IgE (Completed)    Allergen clam IgE (Completed)    Allergen crab IgE (Completed)    Allergen lobster IgE (Completed)    Allergen oyster IgE (Completed)     Allergen scallop IgE (Completed)    Allergen shrimp IgE (Completed)    ALLERGY SKIN TESTS,ALLERGENS (Completed)          Follow up in 3 months or sooner if needed.    Thank you for allowing us to participate in the care of this patient. Please feel free to contact us if there are any questions or concerns about the patient.    Disclaimer: This note consists of symbols derived from keyboarding, dictation and/or voice recognition software. As a result, there may be errors in the script that have gone undetected. Please consider this when interpreting information found in this chart.    Jose Orona MD   Allergy, Asthma and Immunology  [place], [place] and [place] [place]     no

## 2022-10-10 ENCOUNTER — HEALTH MAINTENANCE LETTER (OUTPATIENT)
Age: 72
End: 2022-10-10

## 2023-06-10 ENCOUNTER — HEALTH MAINTENANCE LETTER (OUTPATIENT)
Age: 73
End: 2023-06-10

## 2023-10-28 ENCOUNTER — HEALTH MAINTENANCE LETTER (OUTPATIENT)
Age: 73
End: 2023-10-28

## (undated) RX ORDER — ALBUTEROL SULFATE 90 UG/1
AEROSOL, METERED RESPIRATORY (INHALATION)
Status: DISPENSED
Start: 2017-02-24

## (undated) RX ORDER — AMINOPHYLLINE 25 MG/ML
INJECTION, SOLUTION INTRAVENOUS
Status: DISPENSED
Start: 2017-02-24

## (undated) RX ORDER — REGADENOSON 0.08 MG/ML
INJECTION, SOLUTION INTRAVENOUS
Status: DISPENSED
Start: 2017-02-24